# Patient Record
Sex: FEMALE | Race: BLACK OR AFRICAN AMERICAN | NOT HISPANIC OR LATINO | Employment: OTHER | URBAN - METROPOLITAN AREA
[De-identification: names, ages, dates, MRNs, and addresses within clinical notes are randomized per-mention and may not be internally consistent; named-entity substitution may affect disease eponyms.]

---

## 2018-09-04 ENCOUNTER — HOSPITAL ENCOUNTER (INPATIENT)
Facility: HOSPITAL | Age: 53
LOS: 2 days | Discharge: HOME | DRG: 246 | End: 2018-09-06
Attending: EMERGENCY MEDICINE | Admitting: EMERGENCY MEDICINE
Payer: COMMERCIAL

## 2018-09-04 DIAGNOSIS — N18.6 ESRD ON DIALYSIS: ICD-10-CM

## 2018-09-04 DIAGNOSIS — Z99.2 ESRD ON DIALYSIS: ICD-10-CM

## 2018-09-04 DIAGNOSIS — E87.70 HYPERVOLEMIA, UNSPECIFIED HYPERVOLEMIA TYPE: ICD-10-CM

## 2018-09-04 DIAGNOSIS — N18.30 CHRONIC KIDNEY DISEASE, STAGE III (MODERATE): ICD-10-CM

## 2018-09-04 DIAGNOSIS — R06.02 SOB (SHORTNESS OF BREATH): ICD-10-CM

## 2018-09-04 DIAGNOSIS — J44.9 CHRONIC OBSTRUCTIVE PULMONARY DISEASE, UNSPECIFIED COPD TYPE: ICD-10-CM

## 2018-09-04 DIAGNOSIS — I21.4 NSTEMI (NON-ST ELEVATED MYOCARDIAL INFARCTION): ICD-10-CM

## 2018-09-04 DIAGNOSIS — I21.4 NON-STEMI (NON-ST ELEVATED MYOCARDIAL INFARCTION): Primary | ICD-10-CM

## 2018-09-04 DIAGNOSIS — R06.02 SHORTNESS OF BREATH: ICD-10-CM

## 2018-09-04 PROBLEM — E78.5 HYPERLIPIDEMIA LDL GOAL <70: Status: ACTIVE | Noted: 2018-09-04

## 2018-09-04 LAB
ALBUMIN SERPL BCP-MCNC: 3.7 G/DL
ALP SERPL-CCNC: 105 U/L
ALT SERPL W/O P-5'-P-CCNC: 13 U/L
ANION GAP SERPL CALC-SCNC: 15 MMOL/L
AST SERPL-CCNC: 16 U/L
BASOPHILS # BLD AUTO: 0.03 K/UL
BASOPHILS NFR BLD: 0.4 %
BILIRUB SERPL-MCNC: 0.3 MG/DL
BNP SERPL-MCNC: 3028 PG/ML
BUN SERPL-MCNC: 67 MG/DL
CALCIUM SERPL-MCNC: 9.4 MG/DL
CHLORIDE SERPL-SCNC: 101 MMOL/L
CO2 SERPL-SCNC: 24 MMOL/L
CREAT SERPL-MCNC: 9.8 MG/DL
DIFFERENTIAL METHOD: ABNORMAL
EOSINOPHIL # BLD AUTO: 0.2 K/UL
EOSINOPHIL NFR BLD: 2.1 %
ERYTHROCYTE [DISTWIDTH] IN BLOOD BY AUTOMATED COUNT: 13.6 %
EST. GFR  (AFRICAN AMERICAN): 5 ML/MIN/1.73 M^2
EST. GFR  (NON AFRICAN AMERICAN): 4 ML/MIN/1.73 M^2
GLUCOSE SERPL-MCNC: 144 MG/DL
HCT VFR BLD AUTO: 35.2 %
HGB BLD-MCNC: 11 G/DL
INR PPP: 1.1
LYMPHOCYTES # BLD AUTO: 3 K/UL
LYMPHOCYTES NFR BLD: 37.4 %
MCH RBC QN AUTO: 33 PG
MCHC RBC AUTO-ENTMCNC: 31.3 G/DL
MCV RBC AUTO: 106 FL
MONOCYTES # BLD AUTO: 0.6 K/UL
MONOCYTES NFR BLD: 7.5 %
NEUTROPHILS # BLD AUTO: 4.3 K/UL
NEUTROPHILS NFR BLD: 52.5 %
PHOSPHATE SERPL-MCNC: 4.9 MG/DL
PLATELET # BLD AUTO: 142 K/UL
PMV BLD AUTO: 9.9 FL
POCT GLUCOSE: 217 MG/DL (ref 70–110)
POCT GLUCOSE: 219 MG/DL (ref 70–110)
POCT GLUCOSE: 244 MG/DL (ref 70–110)
POCT GLUCOSE: 361 MG/DL (ref 70–110)
POTASSIUM SERPL-SCNC: 4.3 MMOL/L
PROT SERPL-MCNC: 7.5 G/DL
PROTHROMBIN TIME: 11.1 SEC
RBC # BLD AUTO: 3.33 M/UL
SODIUM SERPL-SCNC: 140 MMOL/L
TROPONIN I SERPL DL<=0.01 NG/ML-MCNC: 2.42 NG/ML
TROPONIN I SERPL DL<=0.01 NG/ML-MCNC: 3.44 NG/ML
TROPONIN I SERPL DL<=0.01 NG/ML-MCNC: 3.56 NG/ML
WBC # BLD AUTO: 8.12 K/UL

## 2018-09-04 PROCEDURE — 85025 COMPLETE CBC W/AUTO DIFF WBC: CPT

## 2018-09-04 PROCEDURE — 93010 ELECTROCARDIOGRAM REPORT: CPT | Mod: 76,NTX,, | Performed by: INTERNAL MEDICINE

## 2018-09-04 PROCEDURE — 99285 EMERGENCY DEPT VISIT HI MDM: CPT | Mod: 25

## 2018-09-04 PROCEDURE — 25000003 PHARM REV CODE 250: Performed by: HOSPITALIST

## 2018-09-04 PROCEDURE — 93005 ELECTROCARDIOGRAM TRACING: CPT

## 2018-09-04 PROCEDURE — 80053 COMPREHEN METABOLIC PANEL: CPT

## 2018-09-04 PROCEDURE — 85610 PROTHROMBIN TIME: CPT

## 2018-09-04 PROCEDURE — 84484 ASSAY OF TROPONIN QUANT: CPT

## 2018-09-04 PROCEDURE — 83970 ASSAY OF PARATHORMONE: CPT

## 2018-09-04 PROCEDURE — 63600175 PHARM REV CODE 636 W HCPCS: Performed by: EMERGENCY MEDICINE

## 2018-09-04 PROCEDURE — 36415 COLL VENOUS BLD VENIPUNCTURE: CPT

## 2018-09-04 PROCEDURE — 84100 ASSAY OF PHOSPHORUS: CPT

## 2018-09-04 PROCEDURE — 63600175 PHARM REV CODE 636 W HCPCS: Performed by: HOSPITALIST

## 2018-09-04 PROCEDURE — 99223 1ST HOSP IP/OBS HIGH 75: CPT | Mod: ,,, | Performed by: INTERNAL MEDICINE

## 2018-09-04 PROCEDURE — 83880 ASSAY OF NATRIURETIC PEPTIDE: CPT

## 2018-09-04 PROCEDURE — 25000003 PHARM REV CODE 250: Performed by: EMERGENCY MEDICINE

## 2018-09-04 PROCEDURE — 21400001 HC TELEMETRY ROOM

## 2018-09-04 PROCEDURE — 83036 HEMOGLOBIN GLYCOSYLATED A1C: CPT

## 2018-09-04 PROCEDURE — 84484 ASSAY OF TROPONIN QUANT: CPT | Mod: 91

## 2018-09-04 PROCEDURE — 93010 ELECTROCARDIOGRAM REPORT: CPT | Mod: ,,, | Performed by: INTERNAL MEDICINE

## 2018-09-04 PROCEDURE — 80100016 HC MAINTENANCE HEMODIALYSIS

## 2018-09-04 PROCEDURE — 25000003 PHARM REV CODE 250: Performed by: INTERNAL MEDICINE

## 2018-09-04 RX ORDER — GLUCAGON 1 MG
1 KIT INJECTION
Status: DISCONTINUED | OUTPATIENT
Start: 2018-09-04 | End: 2018-09-06 | Stop reason: HOSPADM

## 2018-09-04 RX ORDER — ASPIRIN 81 MG/1
81 TABLET ORAL DAILY
Status: DISCONTINUED | OUTPATIENT
Start: 2018-09-04 | End: 2018-09-06 | Stop reason: HOSPADM

## 2018-09-04 RX ORDER — CLOPIDOGREL 300 MG/1
300 TABLET, FILM COATED ORAL ONCE
Status: COMPLETED | OUTPATIENT
Start: 2018-09-04 | End: 2018-09-04

## 2018-09-04 RX ORDER — ACETAMINOPHEN 325 MG/1
650 TABLET ORAL EVERY 6 HOURS PRN
Status: DISCONTINUED | OUTPATIENT
Start: 2018-09-05 | End: 2018-09-06 | Stop reason: HOSPADM

## 2018-09-04 RX ORDER — SODIUM CHLORIDE 9 MG/ML
INJECTION, SOLUTION INTRAVENOUS
Status: DISCONTINUED | OUTPATIENT
Start: 2018-09-04 | End: 2018-09-06 | Stop reason: HOSPADM

## 2018-09-04 RX ORDER — ATORVASTATIN CALCIUM 40 MG/1
80 TABLET, FILM COATED ORAL DAILY
Status: DISCONTINUED | OUTPATIENT
Start: 2018-09-04 | End: 2018-09-06 | Stop reason: HOSPADM

## 2018-09-04 RX ORDER — INSULIN ASPART 100 [IU]/ML
0-5 INJECTION, SOLUTION INTRAVENOUS; SUBCUTANEOUS EVERY 6 HOURS PRN
Status: DISCONTINUED | OUTPATIENT
Start: 2018-09-04 | End: 2018-09-06 | Stop reason: HOSPADM

## 2018-09-04 RX ORDER — TRAMADOL HYDROCHLORIDE 50 MG/1
50 TABLET ORAL EVERY 6 HOURS PRN
Status: COMPLETED | OUTPATIENT
Start: 2018-09-04 | End: 2018-09-05

## 2018-09-04 RX ORDER — GABAPENTIN 300 MG/1
300 CAPSULE ORAL DAILY
Status: DISCONTINUED | OUTPATIENT
Start: 2018-09-04 | End: 2018-09-06 | Stop reason: HOSPADM

## 2018-09-04 RX ORDER — HEPARIN SODIUM 5000 [USP'U]/ML
5000 INJECTION, SOLUTION INTRAVENOUS; SUBCUTANEOUS EVERY 12 HOURS
Status: DISCONTINUED | OUTPATIENT
Start: 2018-09-04 | End: 2018-09-06 | Stop reason: HOSPADM

## 2018-09-04 RX ORDER — NITROGLYCERIN 0.4 MG/1
0.4 TABLET SUBLINGUAL
Status: COMPLETED | OUTPATIENT
Start: 2018-09-04 | End: 2018-09-04

## 2018-09-04 RX ORDER — ACETAMINOPHEN 500 MG
1000 TABLET ORAL
Status: COMPLETED | OUTPATIENT
Start: 2018-09-04 | End: 2018-09-04

## 2018-09-04 RX ORDER — ISOSORBIDE DINITRATE 10 MG/1
10 TABLET ORAL 4 TIMES DAILY
COMMUNITY

## 2018-09-04 RX ORDER — CARVEDILOL 6.25 MG/1
25 TABLET ORAL 2 TIMES DAILY
Status: DISCONTINUED | OUTPATIENT
Start: 2018-09-04 | End: 2018-09-06 | Stop reason: HOSPADM

## 2018-09-04 RX ORDER — ISOSORBIDE DINITRATE 10 MG/1
10 TABLET ORAL 4 TIMES DAILY
Status: DISCONTINUED | OUTPATIENT
Start: 2018-09-04 | End: 2018-09-06 | Stop reason: HOSPADM

## 2018-09-04 RX ORDER — ALBUTEROL SULFATE 2.5 MG/.5ML
0.63 SOLUTION RESPIRATORY (INHALATION) EVERY 6 HOURS PRN
Status: DISCONTINUED | OUTPATIENT
Start: 2018-09-04 | End: 2018-09-06 | Stop reason: HOSPADM

## 2018-09-04 RX ORDER — ALBUTEROL SULFATE 0.63 MG/3ML
0.63 SOLUTION RESPIRATORY (INHALATION) EVERY 6 HOURS PRN
COMMUNITY

## 2018-09-04 RX ADMIN — ISOSORBIDE DINITRATE 10 MG: 10 TABLET ORAL at 08:09

## 2018-09-04 RX ADMIN — INSULIN ASPART 3 UNITS: 100 INJECTION, SOLUTION INTRAVENOUS; SUBCUTANEOUS at 09:09

## 2018-09-04 RX ADMIN — ATORVASTATIN CALCIUM 80 MG: 40 TABLET, FILM COATED ORAL at 05:09

## 2018-09-04 RX ADMIN — CARVEDILOL 25 MG: 6.25 TABLET, FILM COATED ORAL at 08:09

## 2018-09-04 RX ADMIN — ISOSORBIDE DINITRATE 10 MG: 10 TABLET ORAL at 05:09

## 2018-09-04 RX ADMIN — CLOPIDOGREL BISULFATE 300 MG: 300 TABLET, FILM COATED ORAL at 05:09

## 2018-09-04 RX ADMIN — NITROGLYCERIN 0.4 MG: 0.4 TABLET SUBLINGUAL at 07:09

## 2018-09-04 RX ADMIN — ACETAMINOPHEN 1000 MG: 500 TABLET, FILM COATED ORAL at 08:09

## 2018-09-04 RX ADMIN — TRAMADOL HYDROCHLORIDE 50 MG: 50 TABLET, FILM COATED ORAL at 11:09

## 2018-09-04 RX ADMIN — INSULIN ASPART 2 UNITS: 100 INJECTION, SOLUTION INTRAVENOUS; SUBCUTANEOUS at 06:09

## 2018-09-04 RX ADMIN — HEPARIN SODIUM 5000 UNITS: 5000 INJECTION, SOLUTION INTRAVENOUS; SUBCUTANEOUS at 08:09

## 2018-09-04 RX ADMIN — INSULIN ASPART 2 UNITS: 100 INJECTION, SOLUTION INTRAVENOUS; SUBCUTANEOUS at 12:09

## 2018-09-04 NOTE — NURSING
Patient returned from dialysis area . Vitals taken blood sugar taken and covered with low correction dose insulin as ordered. Patient denies pain. Placed on oxygen at 2 liters nasal canula. Head of bed up call light in reach side rail up x 2 .

## 2018-09-04 NOTE — CONSULTS
Renal Consult    Date of Admission:  9/4/2018  5:09 AM    HPI: 52 y.o. AA Female with Hx. ESRD on HD, CAD with PCI at [facility], HTN, COPD, CHF who pesented  to [facility] ED earlier today c/o acute onset SOB with wheezing x4 hours. Pt reports her SOB began yesterday but has gradually worsened. Pt reports she was scheduled to receive dialysis at 8:30 a.m. Today but states she couldn't make it, she  received her last full dialysis Saturday.   Pte. denied chest pain, diaphoresis, abdominal pain and fever.   And reports gaining to much fluid weight this weekend due to lack of restraint w/ her dietary limitations.  Troponin was significantly elevated consistent with non ST elevation MI.  Consulted for ESRD management.    Past Medical History:   Diagnosis Date    Anemia     Anticoagulant long-term use     CAD (coronary artery disease)     Cancer     CHF (congestive heart failure)     Chronic kidney disease     Chronic kidney disease (CKD), stage III (moderate)     COPD (chronic obstructive pulmonary disease)     Depression     Diabetes mellitus     Dyslipidemia     Hyperlipidemia     Hypertension     Secondary hyperparathyroidism      Past Surgical History:   Procedure Laterality Date    AV FISTULA PLACEMENT Right     CHOLECYSTECTOMY      CORONARY ARTERY BYPASS GRAFT  x3    4/7/2012    HYSTERECTOMY      left mastectomy      MASTECTOMY       Review of patient's allergies indicates:  No Known Allergies    No current facility-administered medications on file prior to encounter.      Current Outpatient Medications on File Prior to Encounter   Medication Sig Dispense Refill    albuterol (ACCUNEB) 0.63 mg/3 mL Nebu Take 0.63 mg by nebulization every 6 (six) hours as needed. Rescue      aspirin (ECOTRIN) 81 MG EC tablet Take 81 mg by mouth once daily.      carvedilol (COREG) 25 MG tablet Take 1 tablet (25 mg total) by mouth 2 (two) times daily. 60 tablet  3    fluticasone 50 mcg/actuation DsDv Inhale into the lungs.      gabapentin (NEURONTIN) 300 MG capsule Take 1 capsule (300 mg total) by mouth 2 (two) times daily. 60 capsule 3    isosorbide dinitrate (ISORDIL) 10 MG tablet Take 10 mg by mouth 4 (four) times daily.      blood sugar diagnostic (FREESTYLE INSULINX TEST STRIPS) Strp 1 strip by Misc.(Non-Drug; Combo Route) route 4 (four) times daily with meals and nightly. 100 strip 3    insulin syringes, disposable, 1 mL Syrg 1 Syringe by Misc.(Non-Drug; Combo Route) route 4 (four) times daily before meals and nightly. 100 each 3    lancets (FREESTYLE LANCETS) 28 gauge Misc 1 lancet by Misc.(Non-Drug; Combo Route) route 2 hours after meals and at bedtime. 100 each 3    nitroGLYCERIN (NITROSTAT) 0.4 MG SL tablet Place 1 tablet (0.4 mg total) under the tongue every 5 (five) minutes as needed for Chest pain. 90 tablet 3    [DISCONTINUED] amlodipine (NORVASC) 10 MG tablet TAKE 1 TABLET BY MOUTH ONCE DAILY 90 tablet 0    [DISCONTINUED] furosemide (LASIX) 40 MG tablet Take 1 tablet (40 mg total) by mouth daily as needed (for more than a 3 lbs weight kris in three days). 60 tablet 3    [DISCONTINUED] guaifenesin (MUCINEX) 600 mg 12 hr tablet Take 1,200 mg by mouth daily as needed.      [DISCONTINUED] hydrALAZINE (APRESOLINE) 50 MG tablet Take 1 tablet (50 mg total) by mouth 2 (two) times daily. 60 tablet 3    [DISCONTINUED] insulin aspart (NOVOLOG) 100 unit/mL injection Inject 10 Units into the skin 3 (three) times daily before meals. 10 mL 3    [DISCONTINUED] insulin detemir (LEVEMIR) 100 unit/mL injection Inject 23 Units into the skin every evening. 10 mL 3    [DISCONTINUED] lisinopril 10 MG tablet Take 1 tablet (10 mg total) by mouth once daily. 30 tablet 6    [DISCONTINUED] rosuvastatin (CRESTOR) 10 MG tablet Take 1 tablet (10 mg total) by mouth once daily. 30 tablet 3    [DISCONTINUED] sertraline (ZOLOFT) 50 MG tablet Take 1 tablet (50 mg total) by mouth  once daily. 30 tablet 11    [DISCONTINUED] spironolactone (ALDACTONE) 50 MG tablet TAKE ONE TABLET BY MOUTH AT BEDTIME 30 tablet 3    [DISCONTINUED] tramadol (ULTRAM) 50 mg tablet Take 1 tablet (50 mg total) by mouth every 6 (six) hours as needed. 30 tablet 0       Social History     Socioeconomic History    Marital status: Single     Spouse name: Not on file    Number of children: Not on file    Years of education: Not on file    Highest education level: Not on file   Social Needs    Financial resource strain: Not on file    Food insecurity - worry: Not on file    Food insecurity - inability: Not on file    Transportation needs - medical: Not on file    Transportation needs - non-medical: Not on file   Occupational History    Occupation: Retired   Tobacco Use    Smoking status: Current Every Day Smoker     Packs/day: 0.50     Years: 40.00     Pack years: 20.00     Types: Cigarettes    Smokeless tobacco: Never Used   Substance and Sexual Activity    Alcohol use: No    Drug use: No    Sexual activity: No   Other Topics Concern    Not on file   Social History Narrative    Not on file     Family History   Problem Relation Age of Onset    Rheum arthritis Mother     Diabetes Mother     Hypertension Mother     Rheum arthritis Maternal Grandmother     Cancer Maternal Grandmother     Asthma Sister     Hypertension Sister      Review of Systems   Constitution: Negative.   HENT: Negative.    Eyes: Negative.    Cardiovascular: Positive for chest pain and dyspnea on exertion. Negative for irregular heartbeat, leg swelling, near-syncope, orthopnea, palpitations, paroxysmal nocturnal dyspnea and syncope.   Respiratory: Positive for shortness of breath.    Skin: Negative.    Musculoskeletal: Negative.    Gastrointestinal: Negative for abdominal pain, constipation and diarrhea.   Genitourinary: Negative for dysuria.   Neurological: Negative.    Psychiatric/Behavioral: Negative.      Physical  Exam:    Vitals:    09/04/18 1430 09/04/18 1500 09/04/18 1530 09/04/18 1600   BP: 123/86 124/60 (!) 95/50 93/77   BP Location:       Patient Position:       Pulse: 87 102 95 82   Resp:       Temp:       TempSrc:       SpO2:       Weight:       Height:           No intake/output data recorded.  I/O this shift:  In: 3 [I.V.:3]  Out: -     Constitutional: obese female in NAD  HENT: n/a  Neck: supple.   Cardiovascular: Normal rate and regular rhythm.   Pulmonary/Chest:  No respiratory distress.   Abdominal: obese  Musculoskeletal: She exhibits no edema.   Neurological: She is alert and oriented to person, place, and time.       Laboratories:    Recent Labs   Lab  09/04/18   0547   WBC  8.12   RBC  3.33*   HGB  11.0*   HCT  35.2*   PLT  142*   MCV  106*   MCH  33.0*   MCHC  31.3*       Recent Labs   Lab  09/04/18   0547   CALCIUM  9.4   PROT  7.5   NA  140   K  4.3   CO2  24   CL  101   BUN  67*   CREATININE  9.8*   ALKPHOS  105   ALT  13   AST  16   BILITOT  0.3     Phosphorus                  4.9       Troponin I                  3.555  Troponin I     BNP                  3,028  BNP     Diagnostic Tests:   X-Ray Chest AP Portable [[id number]  FINDINGS:  Monitoring leads overlie the chest.  There is a right-sided chest port in place with catheter tip projecting over the SVC.  There is postoperative change of prior median sternotomy.  There is stable enlargement of the cardiomediastinal silhouette.  Lungs are symmetrically expanded with increased interstitial and parenchymal attenuation which can be seen with pulmonary edema/CHF.  Possible small component of right pleural fluid.  No pneumothorax.  Surgical clips project over the left axilla/chest wall.  Visualized osseous structures are intact.   Impression:       Cardiomegaly and findings suggestive of pulmonary edema/CHF.      Electronically signed by: Barby Cho MD  Date: 09/04/2018  Time: 06:43       Assessment:    53 y/o AAF with Hx. ESRD on HD admitted with:    -  NSTEMI  - Fluid overload likely due to acute on  Chronic combined HF  - Hx. COPD  - HTN  - DM-2  - Anemia of CKD  - Hx. 2nd. hyperparathyroidism  - Hx. CAD    Plan:    - Dialysis in progress  - UF as tolerated  - No need for Epogen at present Hgb level  - Renal ADA diet w/ fluid restriction  - Cardiology following, possible LHC in a.m.  - Glycemic control per admitting

## 2018-09-04 NOTE — CONSULTS
[facility]  Cardiology  Consult Note    Patient Name: Josephine Aldrich  MRN: [id number]  Admission Date: 9/4/2018  Hospital Length of Stay: 0 days  Code Status: Full Code   Attending Provider: Rayne Brandon MD   Consulting Provider: Juan Solomon MD  Primary Care Physician: Bunny Pinzon Jr, MD  Principal Problem:Non-STEMI (non-ST elevated myocardial infarction)    Patient information was obtained from patient, past medical records and ER records.     Inpatient consult to Cardiology  Consult performed by: Juan Solomon MD  Consult ordered by: Rayne Brandon MD  Reason for consult:  non STEMI        Subjective:     Chief Complaint:   Chest pain     HPI:   52 y.o. Female presents to the ED c/o acute onset, SOB with wheezing x4 hours. Pt reports her SOB began yesterday but has gradually worsened. Pt reports she was scheduled to receive dialysis at 8:30 Tuesday morning but states she couldn't make it. Pt reports she received her last full dialysis Saturday. Per EMS pt received breathing treatments along with Solumedrol. Pt denies chest pain, diaphoresis, abdominal pain, and fever.     She has a prior history of CAD with PCI at [facility].  [facility]   Had similar presentation when she had her stent placed for MI.  He does not have a stent card.  She denies any other associated symptoms.  Currently she is chest pain-free.  She denies any PND, orthopnea or lower extremity edema.  She is not experiencing dizziness, presyncope or syncope.  Today is her dialysis day.   Troponin is significantly elevated consistent with non ST elevation MI.        Past Medical History:   Diagnosis Date    Anemia     Anticoagulant long-term use     CAD (coronary artery disease)     Cancer     CHF (congestive heart failure)     Chronic kidney disease     Chronic kidney disease (CKD), stage III (moderate)     COPD (chronic obstructive pulmonary disease)      Depression     Diabetes mellitus     Dyslipidemia     Hyperlipidemia     Hypertension     Secondary hyperparathyroidism        Past Surgical History:   Procedure Laterality Date    AV FISTULA PLACEMENT Right     CHOLECYSTECTOMY      CORONARY ARTERY BYPASS GRAFT  x3    4/7/2012    HYSTERECTOMY      left mastectomy      MASTECTOMY         Review of patient's allergies indicates:  No Known Allergies    No current facility-administered medications on file prior to encounter.      Current Outpatient Medications on File Prior to Encounter   Medication Sig    albuterol (ACCUNEB) 0.63 mg/3 mL Nebu Take 0.63 mg by nebulization every 6 (six) hours as needed. Rescue    aspirin (ECOTRIN) 81 MG EC tablet Take 81 mg by mouth once daily.    carvedilol (COREG) 25 MG tablet Take 1 tablet (25 mg total) by mouth 2 (two) times daily.    fluticasone 50 mcg/actuation DsDv Inhale into the lungs.    gabapentin (NEURONTIN) 300 MG capsule Take 1 capsule (300 mg total) by mouth 2 (two) times daily.    isosorbide dinitrate (ISORDIL) 10 MG tablet Take 10 mg by mouth 4 (four) times daily.    blood sugar diagnostic (FREESTYLE INSULINX TEST STRIPS) Strp 1 strip by Misc.(Non-Drug; Combo Route) route 4 (four) times daily with meals and nightly.    insulin syringes, disposable, 1 mL Syrg 1 Syringe by Misc.(Non-Drug; Combo Route) route 4 (four) times daily before meals and nightly.    lancets (FREESTYLE LANCETS) 28 gauge Misc 1 lancet by Misc.(Non-Drug; Combo Route) route 2 hours after meals and at bedtime.    nitroGLYCERIN (NITROSTAT) 0.4 MG SL tablet Place 1 tablet (0.4 mg total) under the tongue every 5 (five) minutes as needed for Chest pain.    [DISCONTINUED] amlodipine (NORVASC) 10 MG tablet TAKE 1 TABLET BY MOUTH ONCE DAILY    [DISCONTINUED] furosemide (LASIX) 40 MG tablet Take 1 tablet (40 mg total) by mouth daily as needed (for more than a 3 lbs weight kris in three days).    [DISCONTINUED] guaifenesin (MUCINEX) 600 mg  12 hr tablet Take 1,200 mg by mouth daily as needed.    [DISCONTINUED] hydrALAZINE (APRESOLINE) 50 MG tablet Take 1 tablet (50 mg total) by mouth 2 (two) times daily.    [DISCONTINUED] insulin aspart (NOVOLOG) 100 unit/mL injection Inject 10 Units into the skin 3 (three) times daily before meals.    [DISCONTINUED] insulin detemir (LEVEMIR) 100 unit/mL injection Inject 23 Units into the skin every evening.    [DISCONTINUED] lisinopril 10 MG tablet Take 1 tablet (10 mg total) by mouth once daily.    [DISCONTINUED] rosuvastatin (CRESTOR) 10 MG tablet Take 1 tablet (10 mg total) by mouth once daily.    [DISCONTINUED] sertraline (ZOLOFT) 50 MG tablet Take 1 tablet (50 mg total) by mouth once daily.    [DISCONTINUED] spironolactone (ALDACTONE) 50 MG tablet TAKE ONE TABLET BY MOUTH AT BEDTIME    [DISCONTINUED] tramadol (ULTRAM) 50 mg tablet Take 1 tablet (50 mg total) by mouth every 6 (six) hours as needed.     Family History     Problem Relation (Age of Onset)    Asthma Sister    Cancer Maternal Grandmother    Diabetes Mother    Hypertension Mother, Sister    Rheum arthritis Mother, Maternal Grandmother        Tobacco Use    Smoking status: Current Every Day Smoker     Packs/day: 0.50     Years: 40.00     Pack years: 20.00     Types: Cigarettes    Smokeless tobacco: Never Used   Substance and Sexual Activity    Alcohol use: No    Drug use: No    Sexual activity: No     Review of Systems   Constitution: Negative.   HENT: Negative.    Eyes: Negative.    Cardiovascular: Positive for chest pain and dyspnea on exertion. Negative for irregular heartbeat, leg swelling, near-syncope, orthopnea, palpitations, paroxysmal nocturnal dyspnea and syncope.   Respiratory: Positive for shortness of breath.    Skin: Negative.    Musculoskeletal: Negative.    Gastrointestinal: Negative for abdominal pain, constipation and diarrhea.   Genitourinary: Negative for dysuria.   Neurological: Negative.    Psychiatric/Behavioral:  Negative.      Objective:     Vital Signs (Most Recent):  Temp: 97.1 °F (36.2 °C) (09/04/18 1245)  Pulse: 105 (09/04/18 1330)  Resp: 18 (09/04/18 1003)  BP: 136/80 (09/04/18 1330)  SpO2: (!) 90 % (09/04/18 1003) Vital Signs (24h Range):  Temp:  [97.1 °F (36.2 °C)-98.4 °F (36.9 °C)] 97.1 °F (36.2 °C)  Pulse:  [] 105  Resp:  [16-20] 18  SpO2:  [90 %-100 %] 90 %  BP: (115-144)/(54-86) 136/80     Weight: 100.9 kg (222 lb 7.1 oz)  Body mass index is 40.69 kg/m².    SpO2: (!) 90 %  O2 Device (Oxygen Therapy): nasal cannula      Intake/Output Summary (Last 24 hours) at 9/4/2018 1522  Last data filed at 9/4/2018 1003  Gross per 24 hour   Intake 3 ml   Output --   Net 3 ml       Lines/Drains/Airways     Drain                 Hemodialysis AV Fistula   Right upper arm -- days          Peripheral Intravenous Line                 Peripheral IV - Single Lumen 09/04/18 0508 Left Wrist less than 1 day                Physical Exam   Constitutional: She is oriented to person, place, and time. She appears well-developed and well-nourished.   HENT:   Head: Normocephalic and atraumatic.   Eyes: Conjunctivae and EOM are normal. Pupils are equal, round, and reactive to light.   Neck: Normal range of motion. Neck supple. No thyromegaly present.   Cardiovascular: Normal rate and regular rhythm.   No murmur heard.  Pulmonary/Chest: Effort normal and breath sounds normal. No respiratory distress.   Abdominal: Soft. Bowel sounds are normal.   Musculoskeletal: She exhibits no edema.   Neurological: She is alert and oriented to person, place, and time.   Skin: Skin is warm and dry.   Psychiatric: She has a normal mood and affect. Her behavior is normal.       Significant Labs:   CMP   Recent Labs   Lab  09/04/18   0547   NA  140   K  4.3   CL  101   CO2  24   GLU  144*   BUN  67*   CREATININE  9.8*   CALCIUM  9.4   PROT  7.5   ALBUMIN  3.7   BILITOT  0.3   ALKPHOS  105   AST  16   ALT  13   ANIONGAP  15   ESTGFRAFRICA  5*   EGFRNONAA  4*    , CBC   Recent Labs   Lab  09/04/18   0547   WBC  8.12   HGB  11.0*   HCT  35.2*   PLT  142*   , INR   Recent Labs   Lab  09/04/18   0547   INR  1.1   , Lipid Panel No results for input(s): CHOL, HDL, LDLCALC, TRIG, CHOLHDL in the last 48 hours. and Troponin   Recent Labs   Lab  09/04/18   0547  09/04/18   1417   TROPONINI  2.424*  3.555*       Significant Imaging: EKG: Normal sinus rhythm with nonspecific ST-T changes    Assessment and Plan:     * Non-STEMI (non-ST elevated myocardial infarction)     Ruling in for ACS   Plan is for cardiac catheterization after stabilization with hemodialysis   patient is loaded on anti-platelet therapy   likely plan for cardiac catheterization in a.m. As currently stable    **Risks/benefits of the procedure were d/w the patient including bleeding, infection, death, mi, arrhythmia, kidney failure, stroke, etc.  Patient understands and consent was placed on the chart.        CAD (coronary artery disease)     history of CABG and subsequent PCI   Continue medicines for secondary prevention as tolerated        ESRD (end stage renal disease)     On HD,  Today's dialysis day and currently symptomatic   will need HD  To stabilize for respiratory prior to intervention        Hypertension             Type II or unspecified type diabetes mellitus with ophthalmic manifestations, uncontrolled(250.52)     Per IM        Hyperlipidemia LDL goal <70     On statin        Tobacco abuse      Counseled            VTE Risk Mitigation (From admission, onward)        Ordered     heparin (porcine) injection 5,000 Units  Every 12 hours      09/04/18 1239     IP VTE HIGH RISK PATIENT  Once      09/04/18 1059          Thank you for your consult. I will follow-up with patient. Please contact us if you have any additional questions.    Juan Solomon MD  Cardiology   [facility]

## 2018-09-04 NOTE — HPI
52 y.o. Female presents to the ED c/o acute onset, SOB with wheezing x4 hours. Pt reports her SOB began yesterday but has gradually worsened. Pt reports she was scheduled to receive dialysis at 8:30 Tuesday morning but states she couldn't make it. Pt reports she received her last full dialysis Saturday. Per EMS pt received breathing treatments along with Solumedrol. Pt denies chest pain, diaphoresis, abdominal pain, and fever.     She has a prior history of CAD with PCI at [facility].  [facility].   Had similar presentation when she had her stent placed for MI.  He does not have a stent card.  She denies any other associated symptoms.  Currently she is chest pain-free.  She denies any PND, orthopnea or lower extremity edema.  She is not experiencing dizziness, presyncope or syncope.  Today is her dialysis day.   Troponin is significantly elevated consistent with non ST elevation MI.

## 2018-09-04 NOTE — NURSING
Report on patients status post dialysis received from jessie sinclair rn patient tolerated very well vitals stable no fever removed 4 liters off. Awaiting patients return to floor.

## 2018-09-04 NOTE — SUBJECTIVE & OBJECTIVE
Past Medical History:   Diagnosis Date    Anemia     Anticoagulant long-term use     CAD (coronary artery disease)     Cancer     CHF (congestive heart failure)     Chronic kidney disease     Chronic kidney disease (CKD), stage III (moderate)     COPD (chronic obstructive pulmonary disease)     Depression     Diabetes mellitus     Dyslipidemia     Hyperlipidemia     Hypertension     Secondary hyperparathyroidism        Past Surgical History:   Procedure Laterality Date    AV FISTULA PLACEMENT Right     CHOLECYSTECTOMY      CORONARY ARTERY BYPASS GRAFT  x3    4/7/2012    HYSTERECTOMY      left mastectomy      MASTECTOMY         Review of patient's allergies indicates:  No Known Allergies    No current facility-administered medications on file prior to encounter.      Current Outpatient Medications on File Prior to Encounter   Medication Sig    albuterol (ACCUNEB) 0.63 mg/3 mL Nebu Take 0.63 mg by nebulization every 6 (six) hours as needed. Rescue    aspirin (ECOTRIN) 81 MG EC tablet Take 81 mg by mouth once daily.    carvedilol (COREG) 25 MG tablet Take 1 tablet (25 mg total) by mouth 2 (two) times daily.    fluticasone 50 mcg/actuation DsDv Inhale into the lungs.    gabapentin (NEURONTIN) 300 MG capsule Take 1 capsule (300 mg total) by mouth 2 (two) times daily.    isosorbide dinitrate (ISORDIL) 10 MG tablet Take 10 mg by mouth 4 (four) times daily.    blood sugar diagnostic (FREESTYLE INSULINX TEST STRIPS) Strp 1 strip by Misc.(Non-Drug; Combo Route) route 4 (four) times daily with meals and nightly.    insulin syringes, disposable, 1 mL Syrg 1 Syringe by Misc.(Non-Drug; Combo Route) route 4 (four) times daily before meals and nightly.    lancets (FREESTYLE LANCETS) 28 gauge Misc 1 lancet by Misc.(Non-Drug; Combo Route) route 2 hours after meals and at bedtime.    nitroGLYCERIN (NITROSTAT) 0.4 MG SL tablet Place 1 tablet (0.4 mg total) under the tongue every 5 (five) minutes as needed  for Chest pain.    [DISCONTINUED] amlodipine (NORVASC) 10 MG tablet TAKE 1 TABLET BY MOUTH ONCE DAILY    [DISCONTINUED] furosemide (LASIX) 40 MG tablet Take 1 tablet (40 mg total) by mouth daily as needed (for more than a 3 lbs weight kris in three days).    [DISCONTINUED] guaifenesin (MUCINEX) 600 mg 12 hr tablet Take 1,200 mg by mouth daily as needed.    [DISCONTINUED] hydrALAZINE (APRESOLINE) 50 MG tablet Take 1 tablet (50 mg total) by mouth 2 (two) times daily.    [DISCONTINUED] insulin aspart (NOVOLOG) 100 unit/mL injection Inject 10 Units into the skin 3 (three) times daily before meals.    [DISCONTINUED] insulin detemir (LEVEMIR) 100 unit/mL injection Inject 23 Units into the skin every evening.    [DISCONTINUED] lisinopril 10 MG tablet Take 1 tablet (10 mg total) by mouth once daily.    [DISCONTINUED] rosuvastatin (CRESTOR) 10 MG tablet Take 1 tablet (10 mg total) by mouth once daily.    [DISCONTINUED] sertraline (ZOLOFT) 50 MG tablet Take 1 tablet (50 mg total) by mouth once daily.    [DISCONTINUED] spironolactone (ALDACTONE) 50 MG tablet TAKE ONE TABLET BY MOUTH AT BEDTIME    [DISCONTINUED] tramadol (ULTRAM) 50 mg tablet Take 1 tablet (50 mg total) by mouth every 6 (six) hours as needed.     Family History     Problem Relation (Age of Onset)    Asthma Sister    Cancer Maternal Grandmother    Diabetes Mother    Hypertension Mother, Sister    Rheum arthritis Mother, Maternal Grandmother        Tobacco Use    Smoking status: Current Every Day Smoker     Packs/day: 0.50     Years: 40.00     Pack years: 20.00     Types: Cigarettes    Smokeless tobacco: Never Used   Substance and Sexual Activity    Alcohol use: No    Drug use: No    Sexual activity: No     Review of Systems   Constitution: Negative.   HENT: Negative.    Eyes: Negative.    Cardiovascular: Positive for chest pain and dyspnea on exertion. Negative for irregular heartbeat, leg swelling, near-syncope, orthopnea, palpitations,  paroxysmal nocturnal dyspnea and syncope.   Respiratory: Positive for shortness of breath.    Skin: Negative.    Musculoskeletal: Negative.    Gastrointestinal: Negative for abdominal pain, constipation and diarrhea.   Genitourinary: Negative for dysuria.   Neurological: Negative.    Psychiatric/Behavioral: Negative.      Objective:     Vital Signs (Most Recent):  Temp: 97.1 °F (36.2 °C) (09/04/18 1245)  Pulse: 105 (09/04/18 1330)  Resp: 18 (09/04/18 1003)  BP: 136/80 (09/04/18 1330)  SpO2: (!) 90 % (09/04/18 1003) Vital Signs (24h Range):  Temp:  [97.1 °F (36.2 °C)-98.4 °F (36.9 °C)] 97.1 °F (36.2 °C)  Pulse:  [] 105  Resp:  [16-20] 18  SpO2:  [90 %-100 %] 90 %  BP: (115-144)/(54-86) 136/80     Weight: 100.9 kg (222 lb 7.1 oz)  Body mass index is 40.69 kg/m².    SpO2: (!) 90 %  O2 Device (Oxygen Therapy): nasal cannula      Intake/Output Summary (Last 24 hours) at 9/4/2018 1522  Last data filed at 9/4/2018 1003  Gross per 24 hour   Intake 3 ml   Output --   Net 3 ml       Lines/Drains/Airways     Drain                 Hemodialysis AV Fistula   Right upper arm -- days          Peripheral Intravenous Line                 Peripheral IV - Single Lumen 09/04/18 0508 Left Wrist less than 1 day                Physical Exam   Constitutional: She is oriented to person, place, and time. She appears well-developed and well-nourished.   HENT:   Head: Normocephalic and atraumatic.   Eyes: Conjunctivae and EOM are normal. Pupils are equal, round, and reactive to light.   Neck: Normal range of motion. Neck supple. No thyromegaly present.   Cardiovascular: Normal rate and regular rhythm.   No murmur heard.  Pulmonary/Chest: Effort normal and breath sounds normal. No respiratory distress.   Abdominal: Soft. Bowel sounds are normal.   Musculoskeletal: She exhibits no edema.   Neurological: She is alert and oriented to person, place, and time.   Skin: Skin is warm and dry.   Psychiatric: She has a normal mood and affect. Her  behavior is normal.       Significant Labs:   CMP   Recent Labs   Lab  09/04/18   0547   NA  140   K  4.3   CL  101   CO2  24   GLU  144*   BUN  67*   CREATININE  9.8*   CALCIUM  9.4   PROT  7.5   ALBUMIN  3.7   BILITOT  0.3   ALKPHOS  105   AST  16   ALT  13   ANIONGAP  15   ESTGFRAFRICA  5*   EGFRNONAA  4*   , CBC   Recent Labs   Lab  09/04/18   0547   WBC  8.12   HGB  11.0*   HCT  35.2*   PLT  142*   , INR   Recent Labs   Lab  09/04/18   0547   INR  1.1   , Lipid Panel No results for input(s): CHOL, HDL, LDLCALC, TRIG, CHOLHDL in the last 48 hours. and Troponin   Recent Labs   Lab  09/04/18   0547  09/04/18   1417   TROPONINI  2.424*  3.555*       Significant Imaging: EKG: Normal sinus rhythm with nonspecific ST-T changes

## 2018-09-04 NOTE — ED PROVIDER NOTES
Encounter Date: 9/4/2018    SCRIBE #1 NOTE: I, Chapito Terry II, am scribing for, and in the presence of,  Markie Patel MD. I have scribed the following portions of the note - Other sections scribed: HPI, ROS, PE.       History     Chief Complaint   Patient presents with    Shortness of Breath     hx of CHF, COPD x 30 hrs, at 90% on RA, given 2 treatments and solumedrol with EMS      CC: SOB     HPI: This 52 y.o. Female presents to the ED c/o acute onset, SOB with wheezing x4 hours. Pt reports her SOB began yesterday but has gradually worsened. Pt reports she was scheduled to receive dialysis at 8:30 Tuesday morning but states she couldn't make it. Pt reports she received her last full dialysis Saturday. Per EMS pt received breathing treatments along with Solumedrol. Pt denies chest pain, diaphoresis, abdominal pain, and fever.       PMHx: stage III CKD, HTN, anemia, secondary hyperparathyroidism, CAD, CHF, dyslipidemia, NIDDM, depression, COPD, cancer,       The history is provided by the patient. No  was used.     Review of patient's allergies indicates:  No Known Allergies  Past Medical History:   Diagnosis Date    Anemia     Anticoagulant long-term use     CAD (coronary artery disease)     Cancer     CHF (congestive heart failure)     Chronic kidney disease     Chronic kidney disease (CKD), stage III (moderate)     COPD (chronic obstructive pulmonary disease)     Depression     Diabetes mellitus     Dyslipidemia     Hyperlipidemia     Hypertension     Secondary hyperparathyroidism      Past Surgical History:   Procedure Laterality Date    AV FISTULA PLACEMENT Right     CHOLECYSTECTOMY      CORONARY ARTERY BYPASS GRAFT  x3    4/7/2012    HYSTERECTOMY      left mastectomy      MASTECTOMY       Family History   Problem Relation Age of Onset    Rheum arthritis Mother     Diabetes Mother     Hypertension Mother     Rheum arthritis Maternal Grandmother     Cancer  Maternal Grandmother     Asthma Sister     Hypertension Sister      Social History     Tobacco Use    Smoking status: Current Every Day Smoker     Packs/day: 0.50     Years: 40.00     Pack years: 20.00     Types: Cigarettes    Smokeless tobacco: Never Used   Substance Use Topics    Alcohol use: No    Drug use: No     Review of Systems   Constitutional: Negative for chills and fever.   HENT: Negative for congestion, ear pain, rhinorrhea and sore throat.    Eyes: Negative for pain and visual disturbance.   Respiratory: Positive for shortness of breath and wheezing. Negative for cough.    Cardiovascular: Negative for chest pain.   Gastrointestinal: Negative for abdominal pain, diarrhea, nausea and vomiting.   Genitourinary: Negative for dysuria.   Musculoskeletal: Negative for back pain and neck pain.   Skin: Negative for rash.   Neurological: Negative for headaches.       Physical Exam     Initial Vitals [09/04/18 0507]   BP Pulse Resp Temp SpO2   133/78 90 16 98.4 °F (36.9 °C) 100 %      MAP       --         Physical Exam    Nursing note and vitals reviewed.  Constitutional: She appears well-developed and well-nourished. She is cooperative.  Non-toxic appearance. No distress.   HENT:   Head: Normocephalic and atraumatic.   Mouth/Throat: Oropharynx is clear and moist.   Eyes: Conjunctivae and EOM are normal. Pupils are equal, round, and reactive to light.   Neck: Normal range of motion and full passive range of motion without pain. Neck supple. No thyromegaly present. No JVD present.   Cardiovascular: Normal rate, regular rhythm, normal heart sounds and normal pulses.   Pulmonary/Chest: Effort normal. No tachypnea. No respiratory distress.   Faint crackles bilaterally    Left mastectomy       Abdominal: Soft. Normal appearance and bowel sounds are normal. She exhibits no distension and no mass. There is no tenderness.   Musculoskeletal: Normal range of motion.   Trace edema bilatrally   Neurological: She is  alert and oriented to person, place, and time. She has normal strength. No cranial nerve deficit or sensory deficit.   Skin: Skin is warm, dry and intact. No rash noted.   Dialysis graft to right upper arm   Psychiatric: She has a normal mood and affect. Her speech is normal and behavior is normal. Judgment and thought content normal.         ED Course   Procedures  Labs Reviewed   CBC W/ AUTO DIFFERENTIAL - Abnormal; Notable for the following components:       Result Value    RBC 3.33 (*)     Hemoglobin 11.0 (*)     Hematocrit 35.2 (*)      (*)     MCH 33.0 (*)     MCHC 31.3 (*)     Platelets 142 (*)     All other components within normal limits   COMPREHENSIVE METABOLIC PANEL - Abnormal; Notable for the following components:    Glucose 144 (*)     BUN, Bld 67 (*)     Creatinine 9.8 (*)     eGFR if  5 (*)     eGFR if non  4 (*)     All other components within normal limits   TROPONIN I - Abnormal; Notable for the following components:    Troponin I 2.424 (*)     All other components within normal limits   B-TYPE NATRIURETIC PEPTIDE - Abnormal; Notable for the following components:    BNP 3,028 (*)     All other components within normal limits   PHOSPHORUS - Abnormal; Notable for the following components:    Phosphorus 4.9 (*)     All other components within normal limits   PROTIME-INR     EKG Readings: (Independently Interpreted)   Initial Reading: No STEMI. Previous EKG: Compared with most recent EKG Previous EKG Date: 12/26/13. Rhythm: Normal Sinus Rhythm. Heart Rate: 95. Conduction: RBBB. ST Segments: Non-Specific ST Segment Depression. Axis: Left Axis Deviation. Other Findings: Prolonged QT Interval.   No change from previous.        Imaging Results          X-Ray Chest AP Portable (Final result)  Result time 09/04/18 06:43:12    Final result by Barby Cho MD (09/04/18 06:43:12)                 Impression:      Cardiomegaly and findings suggestive of pulmonary  edema/CHF.      Electronically signed by: Barby Cho MD  Date:    09/04/2018  Time:    06:43             Narrative:    EXAMINATION:  XR CHEST AP PORTABLE    CLINICAL HISTORY:  CHF;    TECHNIQUE:  Single frontal view of the chest was performed.    COMPARISON:  Chest radiograph 12/26/2013.    FINDINGS:  Monitoring leads overlie the chest.  There is a right-sided chest port in place with catheter tip projecting over the SVC.  There is postoperative change of prior median sternotomy.  There is stable enlargement of the cardiomediastinal silhouette.  Lungs are symmetrically expanded with increased interstitial and parenchymal attenuation which can be seen with pulmonary edema/CHF.  Possible small component of right pleural fluid.  No pneumothorax.  Surgical clips project over the left axilla/chest wall.  Visualized osseous structures are intact.                                 Medical Decision Making:   Differential Diagnosis:   Differential diagnosis includes fluid overload and COPD.  ED Management:  0600: Patient signed out to Dr. Jimenez at shift change     Patient has no chest pain but feels SOB. Is due for dialysis today. However patient states she feels just like when she has had prior heart attacks. States last LHC was 2 years ago and a stent was placed. Troponin came back significantly elevated. Consulted Dr. Solomon with cardiology who recommends admission for nonstemi and dialysis and then LHC>        Scribe Attestation:   Scribe #1: I performed the above scribed service and the documentation accurately describes the services I performed. I attest to the accuracy of the note.    Attending Attestation:           Physician Attestation for Scribe:  Physician Attestation Statement for Scribe #1: I, Markie Patel MD, reviewed documentation, as scribed by Chapito Sewell II in my presence, and it is both accurate and complete.                    Clinical Impression:   The primary encounter diagnosis was Non-STEMI  (non-ST elevated myocardial infarction). Diagnoses of Shortness of breath, SOB (shortness of breath), Hypervolemia, unspecified hypervolemia type, ESRD on dialysis, Chronic obstructive pulmonary disease, unspecified COPD type, and NSTEMI (non-ST elevated myocardial infarction) were also pertinent to this visit.                             Corey Jimenez MD  09/05/18 1858

## 2018-09-04 NOTE — ED NOTES
Informed of plan of care, pending dialysis, reason Cardiologist came to visit her, pending transfer to floor, verbalized understanding

## 2018-09-04 NOTE — SUBJECTIVE & OBJECTIVE
Past Medical History:   Diagnosis Date    Anemia     Anticoagulant long-term use     CAD (coronary artery disease)     Cancer     CHF (congestive heart failure)     Chronic kidney disease     Chronic kidney disease (CKD), stage III (moderate)     COPD (chronic obstructive pulmonary disease)     Depression     Diabetes mellitus     Dyslipidemia     Hyperlipidemia     Hypertension     Secondary hyperparathyroidism        Past Surgical History:   Procedure Laterality Date    AV FISTULA PLACEMENT Right     CHOLECYSTECTOMY      CORONARY ARTERY BYPASS GRAFT  x3    4/7/2012    HYSTERECTOMY      left mastectomy      MASTECTOMY         Review of patient's allergies indicates:  No Known Allergies    No current facility-administered medications on file prior to encounter.      Current Outpatient Medications on File Prior to Encounter   Medication Sig    albuterol (ACCUNEB) 0.63 mg/3 mL Nebu Take 0.63 mg by nebulization every 6 (six) hours as needed. Rescue    aspirin (ECOTRIN) 81 MG EC tablet Take 81 mg by mouth once daily.    carvedilol (COREG) 25 MG tablet Take 1 tablet (25 mg total) by mouth 2 (two) times daily.    fluticasone 50 mcg/actuation DsDv Inhale into the lungs.    gabapentin (NEURONTIN) 300 MG capsule Take 1 capsule (300 mg total) by mouth 2 (two) times daily.    isosorbide dinitrate (ISORDIL) 10 MG tablet Take 10 mg by mouth 4 (four) times daily.    blood sugar diagnostic (FREESTYLE INSULINX TEST STRIPS) Strp 1 strip by Misc.(Non-Drug; Combo Route) route 4 (four) times daily with meals and nightly.    insulin syringes, disposable, 1 mL Syrg 1 Syringe by Misc.(Non-Drug; Combo Route) route 4 (four) times daily before meals and nightly.    lancets (FREESTYLE LANCETS) 28 gauge Misc 1 lancet by Misc.(Non-Drug; Combo Route) route 2 hours after meals and at bedtime.    nitroGLYCERIN (NITROSTAT) 0.4 MG SL tablet Place 1 tablet (0.4 mg total) under the tongue every 5 (five) minutes as needed  for Chest pain.    [DISCONTINUED] amlodipine (NORVASC) 10 MG tablet TAKE 1 TABLET BY MOUTH ONCE DAILY    [DISCONTINUED] furosemide (LASIX) 40 MG tablet Take 1 tablet (40 mg total) by mouth daily as needed (for more than a 3 lbs weight kris in three days).    [DISCONTINUED] guaifenesin (MUCINEX) 600 mg 12 hr tablet Take 1,200 mg by mouth daily as needed.    [DISCONTINUED] hydrALAZINE (APRESOLINE) 50 MG tablet Take 1 tablet (50 mg total) by mouth 2 (two) times daily.    [DISCONTINUED] insulin aspart (NOVOLOG) 100 unit/mL injection Inject 10 Units into the skin 3 (three) times daily before meals.    [DISCONTINUED] insulin detemir (LEVEMIR) 100 unit/mL injection Inject 23 Units into the skin every evening.    [DISCONTINUED] lisinopril 10 MG tablet Take 1 tablet (10 mg total) by mouth once daily.    [DISCONTINUED] rosuvastatin (CRESTOR) 10 MG tablet Take 1 tablet (10 mg total) by mouth once daily.    [DISCONTINUED] sertraline (ZOLOFT) 50 MG tablet Take 1 tablet (50 mg total) by mouth once daily.    [DISCONTINUED] spironolactone (ALDACTONE) 50 MG tablet TAKE ONE TABLET BY MOUTH AT BEDTIME    [DISCONTINUED] tramadol (ULTRAM) 50 mg tablet Take 1 tablet (50 mg total) by mouth every 6 (six) hours as needed.     Family History     Problem Relation (Age of Onset)    Asthma Sister    Cancer Maternal Grandmother    Diabetes Mother    Hypertension Mother, Sister    Rheum arthritis Mother, Maternal Grandmother        Tobacco Use    Smoking status: Current Every Day Smoker     Packs/day: 0.50     Years: 40.00     Pack years: 20.00     Types: Cigarettes    Smokeless tobacco: Never Used   Substance and Sexual Activity    Alcohol use: No    Drug use: No    Sexual activity: No     Review of Systems   Constitutional: Negative.    HENT: Negative.    Eyes: Negative.    Respiratory: Positive for shortness of breath.    Cardiovascular: Negative.    Gastrointestinal: Negative.    Endocrine: Negative.    Genitourinary: Positive  for difficulty urinating.   Musculoskeletal: Negative.    Neurological: Negative.    Psychiatric/Behavioral: Negative.      Objective:     Vital Signs (Most Recent):  Temp: 98.3 °F (36.8 °C) (09/04/18 1003)  Pulse: 99 (09/04/18 1003)  Resp: 18 (09/04/18 1003)  BP: (!) 115/54 (09/04/18 1003)  SpO2: (!) 90 % (09/04/18 1003) Vital Signs (24h Range):  Temp:  [97.7 °F (36.5 °C)-98.4 °F (36.9 °C)] 98.3 °F (36.8 °C)  Pulse:  [] 99  Resp:  [16-20] 18  SpO2:  [90 %-100 %] 90 %  BP: (115-144)/(54-86) 115/54     Weight: 100.9 kg (222 lb 7.1 oz)  Body mass index is 40.69 kg/m².    Physical Exam   Constitutional: She is oriented to person, place, and time. She appears well-developed and well-nourished. No distress.   HENT:   Head: Normocephalic and atraumatic.   Mouth/Throat: Oropharynx is clear and moist.   Eyes: EOM are normal. Pupils are equal, round, and reactive to light.   Neck: Normal range of motion. Neck supple.   Cardiovascular: Intact distal pulses.   Murmur heard.  Tachycardia S1 S2   Pulmonary/Chest: Effort normal and breath sounds normal. No respiratory distress.   Abdominal: Soft. Bowel sounds are normal. She exhibits no distension.   Musculoskeletal: Normal range of motion. She exhibits edema.   Neurological: She is alert and oriented to person, place, and time.   Skin: Capillary refill takes less than 2 seconds.   Psychiatric: She has a normal mood and affect. Her behavior is normal.         CRANIAL NERVES     CN III, IV, VI   Pupils are equal, round, and reactive to light.  Extraocular motions are normal.        Significant Labs:   A1C: No results for input(s): HGBA1C in the last 4320 hours.  CBC:   Recent Labs   Lab  09/04/18   0547   WBC  8.12   HGB  11.0*   HCT  35.2*   PLT  142*     CMP:   Recent Labs   Lab  09/04/18   0547   NA  140   K  4.3   CL  101   CO2  24   GLU  144*   BUN  67*   CREATININE  9.8*   CALCIUM  9.4   PROT  7.5   ALBUMIN  3.7   BILITOT  0.3   ALKPHOS  105   AST  16   ALT  13    ANIONGAP  15   EGFRNONAA  4*     Cardiac Markers:   Recent Labs   Lab  09/04/18   0547   BNP  3,028*     Coagulation:   Recent Labs   Lab  09/04/18   0547   INR  1.1     Lipid Panel: No results for input(s): CHOL, HDL, LDLCALC, TRIG, CHOLHDL in the last 48 hours.  Magnesium: No results for input(s): MG in the last 48 hours.  POCT Glucose:   Recent Labs   Lab  09/04/18   1108  09/04/18   1203   POCTGLUCOSE  217*  219*     Troponin:   Recent Labs   Lab  09/04/18   0547   TROPONINI  2.424*     Urine Studies: No results for input(s): COLORU, APPEARANCEUA, PHUR, SPECGRAV, PROTEINUA, GLUCUA, KETONESU, BILIRUBINUA, OCCULTUA, NITRITE, UROBILINOGEN, LEUKOCYTESUR, RBCUA, WBCUA, BACTERIA, SQUAMEPITHEL, HYALINECASTS in the last 48 hours.    Invalid input(s): MARTA    Significant Imaging: I have reviewed all pertinent imaging results/findings within the past 24 hours.

## 2018-09-04 NOTE — ASSESSMENT & PLAN NOTE
Troponin trend  Aspirin, statin, bb  Long acting nitrate   Echo pending  HD today, defer to cardiology for LHC

## 2018-09-04 NOTE — ASSESSMENT & PLAN NOTE
Ruling in for ACS   Plan is for cardiac catheterization after stabilization with hemodialysis   patient is loaded on anti-platelet therapy   likely plan for cardiac catheterization in a.m. As currently stable    **Risks/benefits of the procedure were d/w the patient including bleeding, infection, death, mi, arrhythmia, kidney failure, stroke, etc.  Patient understands and consent was placed on the chart.

## 2018-09-04 NOTE — ASSESSMENT & PLAN NOTE
On HD,  Today's dialysis day and currently symptomatic   will need HD  To stabilize for respiratory prior to intervention

## 2018-09-04 NOTE — PLAN OF CARE
Problem: Hemodialysis (Adult)  Goal: Signs and Symptoms of Listed Potential Problems Will be Absent, Minimized or Managed (Hemodialysis)  Signs and symptoms of listed potential problems will be absent, minimized or managed by discharge/transition of care (reference Hemodialysis (Adult) [facility]).   09/04/18 1713   Hemodialysis   Problems Assessed (Hemodialysis) all   Problems Present (Hemodialysis) electrolyte imbalance;fluid imbalance   Hemodialysis for 4 hours with 4 liters fluid removal tolerated well.

## 2018-09-04 NOTE — H&P
[facility] Medicine  History & Physical    Patient Name: Josephine Aldrich  MRN: [id number]  Admission Date: 9/4/2018  Attending Physician: Rayne Brandon MD   Primary Care Provider: Bunny Pinzon Jr, MD         Patient information was obtained from patient and ER records.     Subjective:     Principal Problem:Non-STEMI (non-ST elevated myocardial infarction)    Chief Complaint:   Chief Complaint   Patient presents with    Shortness of Breath     hx of CHF, COPD x 30 hrs, at 90% on RA, given 2 treatments and solumedrol with EMS         HPI: 51 yo female with CAD/CABG, COPD, combined CHF, DM2, HLP and ESRD on HD (TTS) presented from home with SOB. She was scheduled to get HD this morning but felt bad due to respiratory distress. She was given solumedrol and nebs by EMS and felt better on arrival. She denies angina but continues to feel SOB. Currently on 2 liters NC and no respiratory distress. On admit, trop > 2 and BNP >3000. CXR c/w CHF. Cardiology consulted in ED and per ED, will plan for LHC after HD. Nephrology consulted.     Of note, patient gets care at [place] (PCP and cardiologist). Pt asked to go to [place] but EMS reported there were no beds available there. LAst ECHO here 2013 - EF 45% with DD.     Past Medical History:   Diagnosis Date    Anemia     Anticoagulant long-term use     CAD (coronary artery disease)     Cancer     CHF (congestive heart failure)     Chronic kidney disease     Chronic kidney disease (CKD), stage III (moderate)     COPD (chronic obstructive pulmonary disease)     Depression     Diabetes mellitus     Dyslipidemia     Hyperlipidemia     Hypertension     Secondary hyperparathyroidism        Past Surgical History:   Procedure Laterality Date    AV FISTULA PLACEMENT Right     CHOLECYSTECTOMY      CORONARY ARTERY BYPASS GRAFT  x3    4/7/2012    HYSTERECTOMY      left mastectomy      MASTECTOMY         Review of patient's  allergies indicates:  No Known Allergies    No current facility-administered medications on file prior to encounter.      Current Outpatient Medications on File Prior to Encounter   Medication Sig    albuterol (ACCUNEB) 0.63 mg/3 mL Nebu Take 0.63 mg by nebulization every 6 (six) hours as needed. Rescue    aspirin (ECOTRIN) 81 MG EC tablet Take 81 mg by mouth once daily.    carvedilol (COREG) 25 MG tablet Take 1 tablet (25 mg total) by mouth 2 (two) times daily.    fluticasone 50 mcg/actuation DsDv Inhale into the lungs.    gabapentin (NEURONTIN) 300 MG capsule Take 1 capsule (300 mg total) by mouth 2 (two) times daily.    isosorbide dinitrate (ISORDIL) 10 MG tablet Take 10 mg by mouth 4 (four) times daily.    blood sugar diagnostic (FREESTYLE INSULINX TEST STRIPS) Strp 1 strip by Misc.(Non-Drug; Combo Route) route 4 (four) times daily with meals and nightly.    insulin syringes, disposable, 1 mL Syrg 1 Syringe by Misc.(Non-Drug; Combo Route) route 4 (four) times daily before meals and nightly.    lancets (FREESTYLE LANCETS) 28 gauge Misc 1 lancet by Misc.(Non-Drug; Combo Route) route 2 hours after meals and at bedtime.    nitroGLYCERIN (NITROSTAT) 0.4 MG SL tablet Place 1 tablet (0.4 mg total) under the tongue every 5 (five) minutes as needed for Chest pain.    [DISCONTINUED] amlodipine (NORVASC) 10 MG tablet TAKE 1 TABLET BY MOUTH ONCE DAILY    [DISCONTINUED] furosemide (LASIX) 40 MG tablet Take 1 tablet (40 mg total) by mouth daily as needed (for more than a 3 lbs weight kris in three days).    [DISCONTINUED] guaifenesin (MUCINEX) 600 mg 12 hr tablet Take 1,200 mg by mouth daily as needed.    [DISCONTINUED] hydrALAZINE (APRESOLINE) 50 MG tablet Take 1 tablet (50 mg total) by mouth 2 (two) times daily.    [DISCONTINUED] insulin aspart (NOVOLOG) 100 unit/mL injection Inject 10 Units into the skin 3 (three) times daily before meals.    [DISCONTINUED] insulin detemir (LEVEMIR) 100 unit/mL injection  Inject 23 Units into the skin every evening.    [DISCONTINUED] lisinopril 10 MG tablet Take 1 tablet (10 mg total) by mouth once daily.    [DISCONTINUED] rosuvastatin (CRESTOR) 10 MG tablet Take 1 tablet (10 mg total) by mouth once daily.    [DISCONTINUED] sertraline (ZOLOFT) 50 MG tablet Take 1 tablet (50 mg total) by mouth once daily.    [DISCONTINUED] spironolactone (ALDACTONE) 50 MG tablet TAKE ONE TABLET BY MOUTH AT BEDTIME    [DISCONTINUED] tramadol (ULTRAM) 50 mg tablet Take 1 tablet (50 mg total) by mouth every 6 (six) hours as needed.     Family History     Problem Relation (Age of Onset)    Asthma Sister    Cancer Maternal Grandmother    Diabetes Mother    Hypertension Mother, Sister    Rheum arthritis Mother, Maternal Grandmother        Tobacco Use    Smoking status: Current Every Day Smoker     Packs/day: 0.50     Years: 40.00     Pack years: 20.00     Types: Cigarettes    Smokeless tobacco: Never Used   Substance and Sexual Activity    Alcohol use: No    Drug use: No    Sexual activity: No     Review of Systems   Constitutional: Negative.    HENT: Negative.    Eyes: Negative.    Respiratory: Positive for shortness of breath.    Cardiovascular: Negative.    Gastrointestinal: Negative.    Endocrine: Negative.    Genitourinary: Positive for difficulty urinating.   Musculoskeletal: Negative.    Neurological: Negative.    Psychiatric/Behavioral: Negative.      Objective:     Vital Signs (Most Recent):  Temp: 98.3 °F (36.8 °C) (09/04/18 1003)  Pulse: 99 (09/04/18 1003)  Resp: 18 (09/04/18 1003)  BP: (!) 115/54 (09/04/18 1003)  SpO2: (!) 90 % (09/04/18 1003) Vital Signs (24h Range):  Temp:  [97.7 °F (36.5 °C)-98.4 °F (36.9 °C)] 98.3 °F (36.8 °C)  Pulse:  [] 99  Resp:  [16-20] 18  SpO2:  [90 %-100 %] 90 %  BP: (115-144)/(54-86) 115/54     Weight: 100.9 kg (222 lb 7.1 oz)  Body mass index is 40.69 kg/m².    Physical Exam   Constitutional: She is oriented to person, place, and time. She appears  well-developed and well-nourished. No distress.   HENT:   Head: Normocephalic and atraumatic.   Mouth/Throat: Oropharynx is clear and moist.   Eyes: EOM are normal. Pupils are equal, round, and reactive to light.   Neck: Normal range of motion. Neck supple.   Cardiovascular: Intact distal pulses.   Murmur heard.  Tachycardia S1 S2   Pulmonary/Chest: Effort normal and breath sounds normal. No respiratory distress.   Abdominal: Soft. Bowel sounds are normal. She exhibits no distension.   Musculoskeletal: Normal range of motion. She exhibits edema.   Neurological: She is alert and oriented to person, place, and time.   Skin: Capillary refill takes less than 2 seconds.   Psychiatric: She has a normal mood and affect. Her behavior is normal.         CRANIAL NERVES     CN III, IV, VI   Pupils are equal, round, and reactive to light.  Extraocular motions are normal.        Significant Labs:   A1C: No results for input(s): HGBA1C in the last 4320 hours.  CBC:   Recent Labs   Lab  09/04/18   0547   WBC  8.12   HGB  11.0*   HCT  35.2*   PLT  142*     CMP:   Recent Labs   Lab  09/04/18   0547   NA  140   K  4.3   CL  101   CO2  24   GLU  144*   BUN  67*   CREATININE  9.8*   CALCIUM  9.4   PROT  7.5   ALBUMIN  3.7   BILITOT  0.3   ALKPHOS  105   AST  16   ALT  13   ANIONGAP  15   EGFRNONAA  4*     Cardiac Markers:   Recent Labs   Lab  09/04/18   0547   BNP  3,028*     Coagulation:   Recent Labs   Lab  09/04/18   0547   INR  1.1     Lipid Panel: No results for input(s): CHOL, HDL, LDLCALC, TRIG, CHOLHDL in the last 48 hours.  Magnesium: No results for input(s): MG in the last 48 hours.  POCT Glucose:   Recent Labs   Lab  09/04/18   1108  09/04/18   1203   POCTGLUCOSE  217*  219*     Troponin:   Recent Labs   Lab  09/04/18   0547   TROPONINI  2.424*     Urine Studies: No results for input(s): COLORU, APPEARANCEUA, PHUR, SPECGRAV, PROTEINUA, GLUCUA, KETONESU, BILIRUBINUA, OCCULTUA, NITRITE, UROBILINOGEN, LEUKOCYTESUR, RBCUA,  WBCUA, BACTERIA, SQUAMEPITHEL, HYALINECASTS in the last 48 hours.    Invalid input(s): MARTA    Significant Imaging: I have reviewed all pertinent imaging results/findings within the past 24 hours.    Assessment/Plan:     * Non-STEMI (non-ST elevated myocardial infarction)    Troponin trend  Aspirin, statin, bb  Long acting nitrate   Echo pending  HD today, defer to cardiology for [facility]            Hyperlipidemia LDL goal <70    Resume statin   Goal for diabetic <70          ESRD (end stage renal disease)    Nephrology consulted  HD on TTS          Chronic obstructive pulmonary disease    Nebs PRN  Likely needs long acting inhalers ?  No indication for steroids            Diabetes mellitus with neuropathy    AIc pending  SSI  Diabetic diet           Chronic combined systolic and diastolic heart failure    HD for volume control  Daily weights  Cardiac/renal diet  ECHO pending           CAD (coronary artery disease)    See above           Secondary hyperparathyroidism    PTH and phosp pending           Anemia      Secondary to CKD  Defer to nephrology  No evidence of bleeding           VTE Risk Mitigation (From admission, onward)        Ordered     IP VTE HIGH RISK PATIENT  Once      09/04/18 1453             Rayne Brandon MD  Department of Hospital Medicine   [facility]

## 2018-09-04 NOTE — NURSING
"Patient arrived to floor at 1000 placed on telemetry box vitals obtained assessment completed and plan of care updated on board and reviewed with patient at bedside. Call light in reach head of bed elevated side rail up x 2 . Skin intact. Placed on oxygen at 2 liters patient reports she recently fell and was put on tramadol at home patient states ," the tramadol I was taking has made me constipated I usually have a bowel movement everyday. Patient reports she does make urine in small amounts. Patient reports she started dialysis in 2015 av fistula site in right upper arm clean dry and intact no swelling no drainage to site open to air.   "

## 2018-09-04 NOTE — ED NOTES
"1st contact with pt, care assumed, sitting high mccormick in stretcher, appears to be sleeping, mouth open, awaken to verbal stimuli, A&Ox3,obese,  resp even non labored, reports reason for ER visit " shortness of breath" since yesterday noted, pt appears tired, states " I been up all night" , Hx: dialysis, due for dialysis today, pt's scheduled: tues, thurs, saturday, denies CP, rates pain 0/10, pt reports doctor has informed pending dialysis, noted on cardiac monitor, pt, pulse ox, bp on L lower led due mastectomy to L sided chest, dialysis access to R upper arm   "

## 2018-09-04 NOTE — ED NOTES
Physician at bedside. cardiologist Dr. Solomon, pt requesting ice chips, per doctor's verbal order, ok to have ice chips

## 2018-09-04 NOTE — HPI
53 yo female with CAD/CABG, COPD, combined CHF, DM2, HLP and ESRD on HD (TTS) presented from home with SOB. She was scheduled to get HD this morning but felt bad due to respiratory distress. She was given solumedrol and nebs by EMS and felt better on arrival. She denies angina but continues to feel SOB. Currently on 2 liters NC and no respiratory distress. On admit, trop > 2 and BNP >3000. CXR c/w CHF. Cardiology consulted in ED and per ED, will plan for LHC after HD. Nephrology consulted.     Of note, patient gets care at [place] (PCP and cardiologist). Pt asked to go to [place] but EMS reported there were no beds available there. LAst ECHO here 2013 - EF 45% with DD.

## 2018-09-04 NOTE — NURSING
"Paged dr yan he called back . I asked him was he planning to cath her in the morning. Dr Yan states ," I will put my orders in for her. The call ended.   "

## 2018-09-04 NOTE — ED TRIAGE NOTES
Presented to ED with c/o SOB all day. Pt received two breathing tx [id number] solumedrol by EMS. Respirations are unlabored when upright. 02 sat > 95 when placed on 2L NC. She is dialysis patient on T,Th, and Sat. Reported that her last dialysis day was Saturday 9/1 and she's scheduled for dialysis today at 8:30 am.

## 2018-09-05 LAB
AORTIC VALVE REGURGITATION: ABNORMAL
CHOLEST SERPL-MCNC: 149 MG/DL
CHOLEST/HDLC SERPL: 3.9 {RATIO}
DIASTOLIC DYSFUNCTION: YES
ESTIMATED AVG GLUCOSE: 148 MG/DL
ESTIMATED PA SYSTOLIC PRESSURE: 42.69
GLOBAL PERICARDIAL EFFUSION: ABNORMAL
HBA1C MFR BLD HPLC: 6.8 %
HDLC SERPL-MCNC: 38 MG/DL
HDLC SERPL: 25.5 %
LDLC SERPL CALC-MCNC: 82 MG/DL
MITRAL VALVE REGURGITATION: ABNORMAL
NONHDLC SERPL-MCNC: 111 MG/DL
POCT GLUCOSE: 150 MG/DL (ref 70–110)
POCT GLUCOSE: 172 MG/DL (ref 70–110)
POCT GLUCOSE: 187 MG/DL (ref 70–110)
POCT GLUCOSE: 197 MG/DL (ref 70–110)
PTH-INTACT SERPL-MCNC: 464 PG/ML
RETIRED EF AND QEF - SEE NOTES: 45 (ref 55–65)
TRICUSPID VALVE REGURGITATION: ABNORMAL
TRIGL SERPL-MCNC: 145 MG/DL
TROPONIN I SERPL DL<=0.01 NG/ML-MCNC: 4.77 NG/ML
TROPONIN I SERPL DL<=0.01 NG/ML-MCNC: 4.84 NG/ML

## 2018-09-05 PROCEDURE — 92937 PRQ TRLUML REVSC CAB GRF 1: CPT | Mod: RC,,, | Performed by: INTERNAL MEDICINE

## 2018-09-05 PROCEDURE — 94761 N-INVAS EAR/PLS OXIMETRY MLT: CPT

## 2018-09-05 PROCEDURE — 25000003 PHARM REV CODE 250: Performed by: INTERNAL MEDICINE

## 2018-09-05 PROCEDURE — 027034Z DILATION OF CORONARY ARTERY, ONE ARTERY WITH DRUG-ELUTING INTRALUMINAL DEVICE, PERCUTANEOUS APPROACH: ICD-10-PCS | Performed by: INTERNAL MEDICINE

## 2018-09-05 PROCEDURE — 25000003 PHARM REV CODE 250: Performed by: EMERGENCY MEDICINE

## 2018-09-05 PROCEDURE — 93306 TTE W/DOPPLER COMPLETE: CPT | Mod: 26,,, | Performed by: INTERNAL MEDICINE

## 2018-09-05 PROCEDURE — 25000003 PHARM REV CODE 250: Performed by: HOSPITALIST

## 2018-09-05 PROCEDURE — G0269 OCCLUSIVE DEVICE IN VEIN ART: HCPCS

## 2018-09-05 PROCEDURE — B2111ZZ FLUOROSCOPY OF MULTIPLE CORONARY ARTERIES USING LOW OSMOLAR CONTRAST: ICD-10-PCS | Performed by: INTERNAL MEDICINE

## 2018-09-05 PROCEDURE — B2131ZZ FLUOROSCOPY OF MULTIPLE CORONARY ARTERY BYPASS GRAFTS USING LOW OSMOLAR CONTRAST: ICD-10-PCS | Performed by: INTERNAL MEDICINE

## 2018-09-05 PROCEDURE — 80061 LIPID PANEL: CPT

## 2018-09-05 PROCEDURE — 99152 MOD SED SAME PHYS/QHP 5/>YRS: CPT | Mod: ,,, | Performed by: INTERNAL MEDICINE

## 2018-09-05 PROCEDURE — 84484 ASSAY OF TROPONIN QUANT: CPT | Mod: 91

## 2018-09-05 PROCEDURE — 93459 L HRT ART/GRFT ANGIO: CPT | Mod: 26,59,, | Performed by: INTERNAL MEDICINE

## 2018-09-05 PROCEDURE — 4A023N7 MEASUREMENT OF CARDIAC SAMPLING AND PRESSURE, LEFT HEART, PERCUTANEOUS APPROACH: ICD-10-PCS | Performed by: INTERNAL MEDICINE

## 2018-09-05 PROCEDURE — B3101ZZ FLUOROSCOPY OF THORACIC AORTA USING LOW OSMOLAR CONTRAST: ICD-10-PCS | Performed by: INTERNAL MEDICINE

## 2018-09-05 PROCEDURE — 36415 COLL VENOUS BLD VENIPUNCTURE: CPT

## 2018-09-05 PROCEDURE — 93306 TTE W/DOPPLER COMPLETE: CPT

## 2018-09-05 PROCEDURE — C1874 STENT, COATED/COV W/DEL SYS: HCPCS

## 2018-09-05 PROCEDURE — 27000221 HC OXYGEN, UP TO 24 HOURS

## 2018-09-05 PROCEDURE — 25000003 PHARM REV CODE 250

## 2018-09-05 PROCEDURE — 63600175 PHARM REV CODE 636 W HCPCS: Performed by: HOSPITALIST

## 2018-09-05 PROCEDURE — B41F1ZZ FLUOROSCOPY OF RIGHT LOWER EXTREMITY ARTERIES USING LOW OSMOLAR CONTRAST: ICD-10-PCS | Performed by: INTERNAL MEDICINE

## 2018-09-05 PROCEDURE — 63600175 PHARM REV CODE 636 W HCPCS

## 2018-09-05 PROCEDURE — 84484 ASSAY OF TROPONIN QUANT: CPT

## 2018-09-05 PROCEDURE — 21400001 HC TELEMETRY ROOM

## 2018-09-05 RX ORDER — CLOPIDOGREL BISULFATE 75 MG/1
75 TABLET ORAL DAILY
Status: DISCONTINUED | OUTPATIENT
Start: 2018-09-05 | End: 2018-09-06 | Stop reason: HOSPADM

## 2018-09-05 RX ORDER — OXYCODONE AND ACETAMINOPHEN 5; 325 MG/1; MG/1
1 TABLET ORAL EVERY 4 HOURS PRN
Status: DISCONTINUED | OUTPATIENT
Start: 2018-09-05 | End: 2018-09-06 | Stop reason: HOSPADM

## 2018-09-05 RX ADMIN — GABAPENTIN 300 MG: 300 CAPSULE ORAL at 09:09

## 2018-09-05 RX ADMIN — OXYCODONE HYDROCHLORIDE AND ACETAMINOPHEN 1 TABLET: 5; 325 TABLET ORAL at 11:09

## 2018-09-05 RX ADMIN — CARVEDILOL 25 MG: 6.25 TABLET, FILM COATED ORAL at 09:09

## 2018-09-05 RX ADMIN — ISOSORBIDE DINITRATE 10 MG: 10 TABLET ORAL at 05:09

## 2018-09-05 RX ADMIN — HEPARIN SODIUM 5000 UNITS: 5000 INJECTION, SOLUTION INTRAVENOUS; SUBCUTANEOUS at 08:09

## 2018-09-05 RX ADMIN — CARVEDILOL 25 MG: 6.25 TABLET, FILM COATED ORAL at 08:09

## 2018-09-05 RX ADMIN — ISOSORBIDE DINITRATE 10 MG: 10 TABLET ORAL at 12:09

## 2018-09-05 RX ADMIN — CLOPIDOGREL BISULFATE 75 MG: 75 TABLET ORAL at 01:09

## 2018-09-05 RX ADMIN — TRAMADOL HYDROCHLORIDE 50 MG: 50 TABLET, FILM COATED ORAL at 01:09

## 2018-09-05 RX ADMIN — HEPARIN SODIUM 5000 UNITS: 5000 INJECTION, SOLUTION INTRAVENOUS; SUBCUTANEOUS at 09:09

## 2018-09-05 RX ADMIN — ISOSORBIDE DINITRATE 10 MG: 10 TABLET ORAL at 09:09

## 2018-09-05 RX ADMIN — ISOSORBIDE DINITRATE 10 MG: 10 TABLET ORAL at 08:09

## 2018-09-05 RX ADMIN — ASPIRIN 81 MG: 81 TABLET, COATED ORAL at 09:09

## 2018-09-05 RX ADMIN — ATORVASTATIN CALCIUM 80 MG: 40 TABLET, FILM COATED ORAL at 09:09

## 2018-09-05 RX ADMIN — OXYCODONE HYDROCHLORIDE AND ACETAMINOPHEN 1 TABLET: 5; 325 TABLET ORAL at 05:09

## 2018-09-05 NOTE — CONSULTS
Renal Progress Note    Date of Admission:  9/4/2018  5:09 AM    Interim History: s/p LHC reportedly major blockages found and it was amendable to PCI-stent  (cath. Report pending)      Review of Systems:    No CP at present, breathing better     Physical Exam:    Vitals:    09/04/18 2339 09/05/18 0532 09/05/18 0801 09/05/18 1145   BP: 100/66 (!) 126/53 (!) 122/59 115/71   BP Location: Left leg Left leg Left leg Left arm   Patient Position: Lying Lying Lying Lying   Pulse: 100 (!) 59 96 85   Resp: 17 17 18 17   Temp: 97.7 °F (36.5 °C) 98.3 °F (36.8 °C) 98.3 °F (36.8 °C) 98.1 °F (36.7 °C)   TempSrc: Oral Oral Oral Oral   SpO2: 96% 98%  97%   Weight:  101.3 kg (223 lb 5.2 oz)     Height:           I/O last 3 completed shifts:  In: 983 [P.O.:480; I.V.:3; Other:500]  Out: 4500 [Other:4500]  I/O this shift:  In: 3 [I.V.:3]  Out: -     Constitutional: obese female in NAD  HENT: n/a  Neck: supple.   Cardiovascular: Normal rate and regular rhythm.   Pulmonary/Chest:  No respiratory distress.   Abdominal: obese  Musculoskeletal: She exhibits no edema.   Neurological: She is alert and oriented to person, place, and time.       Laboratories:    No results for input(s): WBC, RBC, HGB, HCT, PLT, MCV, MCH, MCHC in the last 24 hours.    No results for input(s): GLUCOSE, CALCIUM, PROT, NA, K, CO2, CL, BUN, CREATININE, ALKPHOS, ALT, AST, BILITOT in the last 24 hours.    Invalid input(s):  ALBUMIN  Phosphorus                  4.9       Troponin I                  3.555  Troponin I     BNP                  3,028  BNP     Diagnostic Tests:   X-Ray Chest AP Portable [[id number]  FINDINGS:  Monitoring leads overlie the chest.  There is a right-sided chest port in place with catheter tip projecting over the SVC.  There is postoperative change of prior median sternotomy.  There is stable enlargement of the cardiomediastinal silhouette.  Lungs are symmetrically expanded with increased interstitial and  parenchymal attenuation which can be seen with pulmonary edema/CHF.  Possible small component of right pleural fluid.  No pneumothorax.  Surgical clips project over the left axilla/chest wall.  Visualized osseous structures are intact.   Impression:       Cardiomegaly and findings suggestive of pulmonary edema/CHF.      Electronically signed by: Barby Cho MD  Date: 09/04/2018  Time: 06:43       Assessment:    51 y/o AAF with Hx. ESRD on HD admitted with:    - NSTEMI  - CAD s/p PCI  - s/p Fluid overload likely due to acute on  Chronic combined HF  - Hx. COPD  - HTN  - DM-2  - Anemia of CKD  - Hx. 2nd. hyperparathyroidism  - Hx. CAD    Plan:    - Dialysis in a.m.  - UF as tolerated  - No need for Epogen at present Hgb level  - Renal ADA diet w/ fluid restriction  - Cardiology following post LHC - PCI  - Glycemic control per admitting  - Smoking cessation a must

## 2018-09-05 NOTE — NURSING
Bedside rounding report given to katrina long rn on patients progress and updated handoff report sheet given to her. No acute events or changes. Bed alarm on call light in reach.

## 2018-09-05 NOTE — SUBJECTIVE & OBJECTIVE
Interval History: p ton bed rest s/p LHC; no CP    Review of Systems   Constitutional: Negative.    HENT: Negative.    Eyes: Negative.    Respiratory: Positive for shortness of breath.    Cardiovascular: Negative.    Gastrointestinal: Negative.    Endocrine: Negative.    Genitourinary: Positive for difficulty urinating.   Musculoskeletal: Negative.    Neurological: Negative.    Psychiatric/Behavioral: Negative.      Objective:     Vital Signs (Most Recent):  Temp: 98.1 °F (36.7 °C) (09/05/18 1145)  Pulse: 85 (09/05/18 1145)  Resp: 17 (09/05/18 1145)  BP: 115/71 (09/05/18 1145)  SpO2: 97 % (09/05/18 1145) Vital Signs (24h Range):  Temp:  [97.7 °F (36.5 °C)-98.3 °F (36.8 °C)] 98.1 °F (36.7 °C)  Pulse:  [] 85  Resp:  [17-18] 17  SpO2:  [96 %-99 %] 97 %  BP: ()/(50-86) 115/71     Weight: 101.3 kg (223 lb 5.2 oz)  Body mass index is 40.85 kg/m².    Intake/Output Summary (Last 24 hours) at 9/5/2018 1303  Last data filed at 9/5/2018 0900  Gross per 24 hour   Intake 983 ml   Output 4500 ml   Net -3517 ml      Physical Exam   Constitutional: She is oriented to person, place, and time. She appears well-developed and well-nourished. No distress.   HENT:   Head: Normocephalic and atraumatic.   Mouth/Throat: Oropharynx is clear and moist.   Eyes: EOM are normal. Pupils are equal, round, and reactive to light.   Neck: Normal range of motion. Neck supple.   Cardiovascular: Intact distal pulses.   Murmur heard.  S1 S2   Pulmonary/Chest: Effort normal and breath sounds normal. No respiratory distress.   Abdominal: Soft. Bowel sounds are normal. She exhibits no distension.   Musculoskeletal: Normal range of motion. She exhibits edema.   Neurological: She is alert and oriented to person, place, and time.   Skin: Capillary refill takes less than 2 seconds.   Psychiatric: She has a normal mood and affect. Her behavior is normal.       Significant Labs:   BMP:   Recent Labs   Lab  09/04/18   0547   GLU  144*   NA  140   K   4.3   CL  101   CO2  24   BUN  67*   CREATININE  9.8*   CALCIUM  9.4     CBC:   Recent Labs   Lab  09/04/18   0547   WBC  8.12   HGB  11.0*   HCT  35.2*   PLT  142*     Cardiac Markers:   Recent Labs   Lab  09/04/18   0547   BNP  3,028*     Troponin:   Recent Labs   Lab  09/04/18   1959  09/05/18   0256  09/05/18   0725   TROPONINI  3.435*  4.840*  4.773*       Significant Imaging: I have reviewed all pertinent imaging results/findings within the past 24 hours.     Description LHC per cardiology:   Culprit ostial lesion SVG to RCA reduced to 0% by direct stenting with a 3 x 9 mm resolute drug-eluting stent inflated up to 16 atmospheres at the ostium.  (* no filter wire was used with difficulty engaging off angle SVG with ostial lesion-- direct stent without complication of no reflow/ distal embolization)  There is severe native triple vessel disease.   There is the SVG to the LAD which is diseased at the anastomosis site into the lesion into the native vessel as well.  There is a patent SVG to diagonal.  There is no vein graft visualized to the left circumflex.  There is a severe left main lesion extending into serial 90% lesions in the left circumflex which is unprotected.

## 2018-09-05 NOTE — NURSING
"Bedside rounding report received from katrina long rn on patients progress and updated handoff report sheet . Assessment completed and plan of care updated on board and reviewed with patient. Nurse katrina informed me patient doesn 't want heart catheter test. I asked the patient why she did not want to consider this diagnostic procedure. Patient states ," I want a second opinion from my cardiologist. " reviewed elevated troponins results with patient and fact she continues to smoke cigarettes. Patient again replied , " I want to discuss this with my doctor I want a second opinion. Bed alarm on call light in reach head of bed elevated side rail up x 3 side rail up x 3 .    "

## 2018-09-05 NOTE — PROGRESS NOTES
[facility] Medicine  Progress Note    Patient Name: Josephine Aldrich  MRN: [id number]  Patient Class: IP- Inpatient   Admission Date: 9/4/2018  Length of Stay: 1 days  Attending Physician: Rayne Brandon MD  Primary Care Provider: Bunny Pinzon Jr, MD        Subjective:     Principal Problem:Non-STEMI (non-ST elevated myocardial infarction)    HPI:  53 yo female with CAD/CABG, COPD, combined CHF, DM2, HLP and ESRD on HD (TTS) presented from home with SOB. She was scheduled to get HD this morning but felt bad due to respiratory distress. She was given solumedrol and nebs by EMS and felt better on arrival. She denies angina but continues to feel SOB. Currently on 2 liters NC and no respiratory distress. On admit, trop > 2 and BNP >3000. CXR c/w CHF. Cardiology consulted in ED and per ED, will plan for LHC after HD. Nephrology consulted.     Of note, patient gets care at [place] (PCP and cardiologist). Pt asked to go to [place] but EMS reported there were no beds available there. LAst ECHO here 2013 - EF 45% with DD.     Hospital Course:  Pt admitted with SOB and elevated troponin/known CAD/CABG. Pt received HD yesterday and underwent LHC 9/5.  Findings below. Continue medical management. PLan for HD tomorrow.     Interval History: p ton bed rest s/p LHC; no CP    Review of Systems   Constitutional: Negative.    HENT: Negative.    Eyes: Negative.    Respiratory: Positive for shortness of breath.    Cardiovascular: Negative.    Gastrointestinal: Negative.    Endocrine: Negative.    Genitourinary: Positive for difficulty urinating.   Musculoskeletal: Negative.    Neurological: Negative.    Psychiatric/Behavioral: Negative.      Objective:     Vital Signs (Most Recent):  Temp: 98.1 °F (36.7 °C) (09/05/18 1145)  Pulse: 85 (09/05/18 1145)  Resp: 17 (09/05/18 1145)  BP: 115/71 (09/05/18 1145)  SpO2: 97 % (09/05/18 1145) Vital Signs (24h Range):  Temp:  [97.7 °F (36.5 °C)-98.3 °F  (36.8 °C)] 98.1 °F (36.7 °C)  Pulse:  [] 85  Resp:  [17-18] 17  SpO2:  [96 %-99 %] 97 %  BP: ()/(50-86) 115/71     Weight: 101.3 kg (223 lb 5.2 oz)  Body mass index is 40.85 kg/m².    Intake/Output Summary (Last 24 hours) at 9/5/2018 1303  Last data filed at 9/5/2018 0900  Gross per 24 hour   Intake 983 ml   Output 4500 ml   Net -3517 ml      Physical Exam   Constitutional: She is oriented to person, place, and time. She appears well-developed and well-nourished. No distress.   HENT:   Head: Normocephalic and atraumatic.   Mouth/Throat: Oropharynx is clear and moist.   Eyes: EOM are normal. Pupils are equal, round, and reactive to light.   Neck: Normal range of motion. Neck supple.   Cardiovascular: Intact distal pulses.   Murmur heard.  S1 S2   Pulmonary/Chest: Effort normal and breath sounds normal. No respiratory distress.   Abdominal: Soft. Bowel sounds are normal. She exhibits no distension.   Musculoskeletal: Normal range of motion. She exhibits edema.   Neurological: She is alert and oriented to person, place, and time.   Skin: Capillary refill takes less than 2 seconds.   Psychiatric: She has a normal mood and affect. Her behavior is normal.       Significant Labs:   BMP:   Recent Labs   Lab  09/04/18   0547   GLU  144*   NA  140   K  4.3   CL  101   CO2  24   BUN  67*   CREATININE  9.8*   CALCIUM  9.4     CBC:   Recent Labs   Lab  09/04/18   0547   WBC  8.12   HGB  11.0*   HCT  35.2*   PLT  142*     Cardiac Markers:   Recent Labs   Lab  09/04/18   0547   BNP  3,028*     Troponin:   Recent Labs   Lab  09/04/18   1959  09/05/18   0256  09/05/18   0725   TROPONINI  3.435*  4.840*  4.773*       Significant Imaging: I have reviewed all pertinent imaging results/findings within the past 24 hours.     Description LHC per cardiology:   Culprit ostial lesion SVG to RCA reduced to 0% by direct stenting with a 3 x 9 mm resolute drug-eluting stent inflated up to 16 atmospheres at the ostium.  (* no filter  wire was used with difficulty engaging off angle SVG with ostial lesion-- direct stent without complication of no reflow/ distal embolization)  There is severe native triple vessel disease.   There is the SVG to the LAD which is diseased at the anastomosis site into the lesion into the native vessel as well.  There is a patent SVG to diagonal.  There is no vein graft visualized to the left circumflex.  There is a severe left main lesion extending into serial 90% lesions in the left circumflex which is unprotected.          Assessment/Plan:      * Non-STEMI (non-ST elevated myocardial infarction)    Troponin peak 4.84  Aspirin, statin, bb  Long acting nitrate   Echo pending  [facility] 9/5 - KIARA placed SVG to RCA            Hyperlipidemia LDL goal <70    Resume statin   Goal for diabetic <70          ESRD (end stage renal disease)    Nephrology consulted  HD on TTS          Chronic obstructive pulmonary disease    Nebs PRN  Likely needs long acting inhalers ?  No indication for steroids            Diabetes mellitus with neuropathy    AIc pending  SSI  Diabetic diet           Chronic combined systolic and diastolic heart failure    HD for volume control  Daily weights  Cardiac/renal diet  ECHO pending           CAD (coronary artery disease)    See above           Secondary hyperparathyroidism    PTH and phosp pending           Anemia      Secondary to CKD  Defer to nephrology  No evidence of bleeding           VTE Risk Mitigation (From admission, onward)        Ordered     heparin (porcine) injection 5,000 Units  Every 12 hours      09/04/18 1239     IP VTE HIGH RISK PATIENT  Once      09/04/18 1059              Rayne Brandon MD  Department of Hospital Medicine   [facility]

## 2018-09-05 NOTE — ASSESSMENT & PLAN NOTE
Troponin peak 4.84  Aspirin, statin, bb  Long acting nitrate   Echo pending  [facility] 9/5 - KIARA placed SVG to RCA

## 2018-09-05 NOTE — NURSING
1730- tolerated supper meal covered with correction dose insulin as ordered . Vitals stable , relief obtained with tramadol . No changes on telemetry . Bed alarm on call light in reach head of bed elevated side rail up x 3 .

## 2018-09-05 NOTE — NURSING
1145- patient returned from cath lab report received from jasen samuels patient did have blockages major blockage was amendable to intervention did get stent to RCA area in her svg site. Bed rest up at 1330 today . Dr yan spoke with patient at bedside as well discussing treatment options and wants to follow up with patient as her cardiologist also strongly advised the patient to quit smoking. Patient started crying after her left I held her hand and explained to her she will have a better quality of life following up with dr yan and smoking cessation patient nodded her head in agreement. Provide ice chips to patient after ok from dr yan. . No acute changes on telemetry bed alarm on call light  In reach side rail up x 2 . Cycling vitals . Plan reviewed with patient.

## 2018-09-05 NOTE — NURSING
1000- patient did speak to dr yan this am and changed her mind about having a diagnostic cath . Pt transported to cath lab by bed . No acute distress or changes. She did void prior to leaving in toilet and has been npo status.       1111- awaiting patients return from cath lab.

## 2018-09-05 NOTE — BRIEF OP NOTE
[facility]  Brief Operative Note    SUMMARY     Surgery Date: 9/5/2018     Surgeon(s) and Role:     * Juan Solomon MD - Primary    Assisting Surgeon: None    Pre-op Diagnosis:  Non-STEMI (non-ST elevated myocardial infarction) [I21.4]    Post-op Diagnosis:    Non STEMI    Procedure(s) (LRB):  Left heart cath, groin (Left)    Anesthesia: RN IV Sedation    Description of Procedure:  Left heart catheterization with selective coronary angiography, graft restudy, PCI of SVG to RCA, ascending aortogram  Via right common femoral artery    Description of the findings of the procedure:  Culprit ostial lesion SVG to RCA reduced to 0% by direct stenting with a 3 x 9 mm resolute drug-eluting stent inflated up to 16 atmospheres at the ostium.  (* no filter wire was used with difficulty engaging off angle SVG with ostial lesion-- direct stent without complication of no reflow/ distal embolization)  There is severe native triple vessel disease.   There is the SVG to the LAD which is diseased at the anastomosis site into the lesion into the native vessel as well.  There is a patent SVG to diagonal.  There is no vein graft visualized to the left circumflex.  There is a severe left main lesion extending into serial 90% lesions in the left circumflex which is unprotected.        Recommendation:   -Continue medical management   -Consider staged revascularization of SVG to LAD as well as left main into left circumflex if continued symptoms on max med therapy  - if symptoms are stable consider PET scan for risk stratification    Estimated Blood Loss: <50 cc         Specimens:   Specimen (12h ago, onward)    None

## 2018-09-05 NOTE — PLAN OF CARE
09/05/18 0955   Discharge Assessment   Assessment Type Discharge Planning Assessment   Confirmed/corrected address and phone number on facesheet? Yes   Assessment information obtained from? Patient   Prior to hospitilization cognitive status: Alert/Oriented   Prior to hospitalization functional status: Independent   Current cognitive status: Alert/Oriented   Current Functional Status: Independent   Lives With alone   Able to Return to Prior Arrangements yes   Is patient able to care for self after discharge? Yes   Who are your caregiver(s) and their phone number(s)? miriam- [phone]   Patient's perception of discharge disposition home or selfcare   Readmission Within The Last 30 Days no previous admission in last 30 days   Patient currently being followed by outpatient case management? No   Patient currently receives any other outside agency services? No   Equipment Currently Used at Home glucometer   Do you have any problems affording any of your prescribed medications? No   Is the patient taking medications as prescribed? yes   Does the patient have transportation home? Yes   Transportation Available car;family or friend will provide   Does the patient receive services at the [facility]mad[facility] Clinic? No   Discharge Plan A Home with family   Discharge Plan B Home with family  (with follow up appointments)   Patient/Family In Agreement With Plan yes     [id number] [facility] [place] [address] AT [facility] & [address]  [place] [address]  Phone: [phone] Fax: [phone]

## 2018-09-06 VITALS
RESPIRATION RATE: 20 BRPM | BODY MASS INDEX: 42.19 KG/M2 | OXYGEN SATURATION: 100 % | TEMPERATURE: 98 F | SYSTOLIC BLOOD PRESSURE: 112 MMHG | DIASTOLIC BLOOD PRESSURE: 60 MMHG | WEIGHT: 229.25 LBS | HEIGHT: 62 IN | HEART RATE: 83 BPM

## 2018-09-06 LAB
POCT GLUCOSE: 125 MG/DL (ref 70–110)
POCT GLUCOSE: 128 MG/DL (ref 70–110)
POCT GLUCOSE: 149 MG/DL (ref 70–110)

## 2018-09-06 PROCEDURE — 94761 N-INVAS EAR/PLS OXIMETRY MLT: CPT

## 2018-09-06 PROCEDURE — 63600175 PHARM REV CODE 636 W HCPCS: Performed by: HOSPITALIST

## 2018-09-06 PROCEDURE — 99232 SBSQ HOSP IP/OBS MODERATE 35: CPT | Mod: ,,, | Performed by: INTERNAL MEDICINE

## 2018-09-06 PROCEDURE — 80100016 HC MAINTENANCE HEMODIALYSIS

## 2018-09-06 PROCEDURE — 25000003 PHARM REV CODE 250: Performed by: INTERNAL MEDICINE

## 2018-09-06 PROCEDURE — 25000003 PHARM REV CODE 250: Performed by: EMERGENCY MEDICINE

## 2018-09-06 PROCEDURE — 25000242 PHARM REV CODE 250 ALT 637 W/ HCPCS: Performed by: EMERGENCY MEDICINE

## 2018-09-06 PROCEDURE — 27000221 HC OXYGEN, UP TO 24 HOURS

## 2018-09-06 PROCEDURE — 94640 AIRWAY INHALATION TREATMENT: CPT

## 2018-09-06 RX ORDER — CLOPIDOGREL BISULFATE 75 MG/1
75 TABLET ORAL DAILY
Qty: 30 TABLET | Refills: 11 | Status: SHIPPED | OUTPATIENT
Start: 2018-09-07 | End: 2019-09-07

## 2018-09-06 RX ORDER — OXYCODONE AND ACETAMINOPHEN 5; 325 MG/1; MG/1
1 TABLET ORAL EVERY 4 HOURS PRN
Qty: 30 TABLET | Refills: 0 | Status: SHIPPED | OUTPATIENT
Start: 2018-09-06

## 2018-09-06 RX ORDER — ATORVASTATIN CALCIUM 80 MG/1
80 TABLET, FILM COATED ORAL DAILY
Qty: 90 TABLET | Refills: 3 | Status: SHIPPED | OUTPATIENT
Start: 2018-09-07 | End: 2019-09-07

## 2018-09-06 RX ADMIN — CLOPIDOGREL BISULFATE 75 MG: 75 TABLET ORAL at 08:09

## 2018-09-06 RX ADMIN — ATORVASTATIN CALCIUM 80 MG: 40 TABLET, FILM COATED ORAL at 08:09

## 2018-09-06 RX ADMIN — ALBUTEROL SULFATE 0.63 MG: 2.5 SOLUTION RESPIRATORY (INHALATION) at 06:09

## 2018-09-06 RX ADMIN — HEPARIN SODIUM 5000 UNITS: 5000 INJECTION, SOLUTION INTRAVENOUS; SUBCUTANEOUS at 08:09

## 2018-09-06 RX ADMIN — ASPIRIN 81 MG: 81 TABLET, COATED ORAL at 08:09

## 2018-09-06 NOTE — SUBJECTIVE & OBJECTIVE
Interval History:  Seen on dialysis.  When she was connected she started experiencing some palpitations but after weeks almond her she was reassured and felt better.      Telemetry: Normal sinus rate    Review of Systems   All other systems reviewed and are negative.    Objective:     Vital Signs (Most Recent):  Temp: 98 °F (36.7 °C) (09/06/18 0805)  Pulse: 86 (09/06/18 0805)  Resp: 18 (09/06/18 0805)  BP: (!) 108/54 (09/06/18 0805)  SpO2: 97 % (09/06/18 0805) Vital Signs (24h Range):  Temp:  [97.5 °F (36.4 °C)-98.1 °F (36.7 °C)] 98 °F (36.7 °C)  Pulse:  [68-91] 86  Resp:  [16-20] 18  SpO2:  [93 %-100 %] 97 %  BP: ()/(52-78) 108/54     Weight: 104 kg (229 lb 4.5 oz)  Body mass index is 41.94 kg/m².     SpO2: 97 %  O2 Device (Oxygen Therapy): nasal cannula      Intake/Output Summary (Last 24 hours) at 9/6/2018 1222  Last data filed at 9/5/2018 1800  Gross per 24 hour   Intake 480 ml   Output --   Net 480 ml       Lines/Drains/Airways     Drain                 Hemodialysis AV Fistula   Right upper arm -- days          Peripheral Intravenous Line                 Peripheral IV - Single Lumen 09/04/18 0508 Left Wrist 2 days                Physical Exam   Constitutional: She is oriented to person, place, and time. No distress.   Cardiovascular: Normal rate and regular rhythm.   Pulmonary/Chest: Effort normal and breath sounds normal.   Musculoskeletal: She exhibits no edema.   Right groin access site without hematoma or bruit   Neurological: She is alert and oriented to person, place, and time.       Significant Labs: BMP: No results for input(s): GLU, NA, K, CL, CO2, BUN, CREATININE, CALCIUM, MG in the last 48 hours. and CBC No results for input(s): WBC, HGB, HCT, PLT in the last 48 hours.    Significant Imaging: Echocardiogram:   2D echo with color flow doppler:   Results for orders placed or performed during the hospital encounter of 09/04/18   2D echo with color flow doppler   Result Value Ref Range    EF 45 55  - 65    Mitral Valve Regurgitation MODERATE (A)     Diastolic Dysfunction Yes (A)     Aortic Valve Regurgitation MILD TO MODERATE (A)     Est. PA Systolic Pressure 42.69 (A)     Pericardial Effusion TRIVIAL     Tricuspid Valve Regurgitation MILD TO MODERATE      LHC:  Description of Procedure:  Left heart catheterization with selective coronary angiography, graft restudy, PCI of SVG to RCA, ascending aortogram  Via right common femoral artery     Description of the findings of the procedure:  Culprit ostial lesion SVG to RCA reduced to 0% by direct stenting with a 3 x 9 mm resolute drug-eluting stent inflated up to 16 atmospheres at the ostium.  (* no filter wire was used with difficulty engaging off angle SVG with ostial lesion-- direct stent without complication of no reflow/ distal embolization)  There is severe native triple vessel disease.   There is the SVG to the LAD which is diseased at the anastomosis site into the lesion into the native vessel as well.  There is a patent SVG to diagonal.  There is no vein graft visualized to the left circumflex.  There is a severe left main lesion extending into serial 90% lesions in the left circumflex which is unprotected.         Recommendation:   -Continue medical management   -Consider staged revascularization of SVG to LAD as well as left main into left circumflex if continued symptoms on max med therapy  - if symptoms are stable consider PET scan for risk stratification

## 2018-09-06 NOTE — PROGRESS NOTES
[facility]  Cardiology  Progress Note    Patient Name: Josephine Aldrich  MRN: [id number]  Admission Date: 9/4/2018  Hospital Length of Stay: 2 days  Code Status: Full Code   Attending Physician: Rayne Brandon MD   Primary Care Physician: Bunny Pinzon Jr, MD  Expected Discharge Date:   Principal Problem:Non-STEMI (non-ST elevated myocardial infarction)    Subjective:     Hospital Course:    9-4:  Admitted with non STEMI.  Respiratory failure requiring dialysis    9-5:   Status post PCI of RCA SVG    Interval History:  Seen on dialysis.  When she was connected she started experiencing some palpitations but after weeks almond her she was reassured and felt better.      Telemetry: Normal sinus rate    Review of Systems   All other systems reviewed and are negative.    Objective:     Vital Signs (Most Recent):  Temp: 98 °F (36.7 °C) (09/06/18 0805)  Pulse: 86 (09/06/18 0805)  Resp: 18 (09/06/18 0805)  BP: (!) 108/54 (09/06/18 0805)  SpO2: 97 % (09/06/18 0805) Vital Signs (24h Range):  Temp:  [97.5 °F (36.4 °C)-98.1 °F (36.7 °C)] 98 °F (36.7 °C)  Pulse:  [68-91] 86  Resp:  [16-20] 18  SpO2:  [93 %-100 %] 97 %  BP: ()/(52-78) 108/54     Weight: 104 kg (229 lb 4.5 oz)  Body mass index is 41.94 kg/m².     SpO2: 97 %  O2 Device (Oxygen Therapy): nasal cannula      Intake/Output Summary (Last 24 hours) at 9/6/2018 1222  Last data filed at 9/5/2018 1800  Gross per 24 hour   Intake 480 ml   Output --   Net 480 ml       Lines/Drains/Airways     Drain                 Hemodialysis AV Fistula   Right upper arm -- days          Peripheral Intravenous Line                 Peripheral IV - Single Lumen 09/04/18 0508 Left Wrist 2 days                Physical Exam   Constitutional: She is oriented to person, place, and time. No distress.   Cardiovascular: Normal rate and regular rhythm.   Pulmonary/Chest: Effort normal and breath sounds normal.   Musculoskeletal: She exhibits no edema.   Right groin access  site without hematoma or bruit   Neurological: She is alert and oriented to person, place, and time.       Significant Labs: BMP: No results for input(s): GLU, NA, K, CL, CO2, BUN, CREATININE, CALCIUM, MG in the last 48 hours. and CBC No results for input(s): WBC, HGB, HCT, PLT in the last 48 hours.    Significant Imaging: Echocardiogram:   2D echo with color flow doppler:   Results for orders placed or performed during the hospital encounter of 09/04/18   2D echo with color flow doppler   Result Value Ref Range    EF 45 55 - 65    Mitral Valve Regurgitation MODERATE (A)     Diastolic Dysfunction Yes (A)     Aortic Valve Regurgitation MILD TO MODERATE (A)     Est. PA Systolic Pressure 42.69 (A)     Pericardial Effusion TRIVIAL     Tricuspid Valve Regurgitation MILD TO MODERATE      LHC:  Description of Procedure:  Left heart catheterization with selective coronary angiography, graft restudy, PCI of SVG to RCA, ascending aortogram  Via right common femoral artery     Description of the findings of the procedure:  Culprit ostial lesion SVG to RCA reduced to 0% by direct stenting with a 3 x 9 mm resolute drug-eluting stent inflated up to 16 atmospheres at the ostium.  (* no filter wire was used with difficulty engaging off angle SVG with ostial lesion-- direct stent without complication of no reflow/ distal embolization)  There is severe native triple vessel disease.   There is the SVG to the LAD which is diseased at the anastomosis site into the lesion into the native vessel as well.  There is a patent SVG to diagonal.  There is no vein graft visualized to the left circumflex.  There is a severe left main lesion extending into serial 90% lesions in the left circumflex which is unprotected.         Recommendation:   -Continue medical management   -Consider staged revascularization of SVG to LAD as well as left main into left circumflex if continued symptoms on max med therapy  - if symptoms are stable consider PET scan  for risk stratification           Assessment and Plan:     Brief HPI:     * Non-STEMI (non-ST elevated myocardial infarction)      Multivessel CAD  Status post PCI of culprit SVG to the RCA   may need outpatient intervention on the SVG to LAD or native left main into left circumflex which is unprotected if further symptoms on max medical therapy   if stable consideration is for PET stress to evaluate for ischemia in those territories      CAD (coronary artery disease)     Continue medicines for secondary prevention as tolerated        ESRD (end stage renal disease)     On HD,  Today's dialysis day and currently symptomatic   will need HD  To stabilize for respiratory prior to intervention        Hypertension             Type II or unspecified type diabetes mellitus with ophthalmic manifestations, uncontrolled(250.52)     Per IM        Hyperlipidemia LDL goal <70     On statin        Tobacco abuse      Counseled            VTE Risk Mitigation (From admission, onward)        Ordered     heparin (porcine) injection 5,000 Units  Every 12 hours      09/04/18 1239     IP VTE HIGH RISK PATIENT  Once      09/04/18 1059          Okay to DC from CV standpoint.  Follow up with me in 1 week on hospital discharge.    Juan Solomon MD  Cardiology  [facility]

## 2018-09-06 NOTE — HOSPITAL COURSE
9-4:  Admitted with non STEMI.  Respiratory failure requiring dialysis    9-5:   Status post PCI of RCA SVG

## 2018-09-06 NOTE — NURSING
Bedside rounding report given to cece guadarrama rn on patients progress and updated handoff report sheet given to him. No acute events. Right groin is soft and without hematoma or drainage on existing dressing clean dry and intact iv site clean dry and intact.

## 2018-09-06 NOTE — PLAN OF CARE
"   09/06/18 1616   Final Note   Assessment Type Final Discharge Note   Discharge Disposition Home   Hospital Follow Up  Appt(s) scheduled? Yes   Discharge plans and expectations educations in teach back method with documentation complete? Yes   Right Care Referral Info   Post Acute Recommendation No Care   EDUCATION:   provided with educational information on post angio stent placement.  Information reviewed and placed in :My Healthcare Packet" to be brought home  to use as resource after discharge.  Information included:  signs and symptoms to look for and call the doctor if experiencing, and symptoms that may indicate a medical emergency: CALL 911.      All questions answered.  Teach back method used.           "

## 2018-09-06 NOTE — CONSULTS
Renal Progress Note    Date of Admission:  9/4/2018  5:09 AM    Interim History: s/p LHC reportedly major blockages found and it was amendable to PCI-stent  (cath. Report pending)      Review of Systems:    Had complaints of CP at the beginning of Dialysis, Cardiologist at bedside talking to pte.    Physical Exam:    Vitals:    09/06/18 0605 09/06/18 0627 09/06/18 0712 09/06/18 0805   BP:  (!) 111/55  (!) 108/54   BP Location:  Left leg     Patient Position:  Lying  Lying   Pulse: 90 82  86   Resp: 20 18 18   Temp:  97.8 °F (36.6 °C)  98 °F (36.7 °C)   TempSrc:  Oral  Oral   SpO2: 97% 100% (!) 93% 97%   Weight:  104 kg (229 lb 4.5 oz)     Height:           I/O last 3 completed shifts:  In: 483 [P.O.:480; I.V.:3]  Out: 0   No intake/output data recorded.    Constitutional: obese female in NAD  HENT: n/a  Neck: supple.   Cardiovascular: Normal rate and regular rhythm.   Pulmonary/Chest:  No respiratory distress.   Abdominal: obese  Musculoskeletal: She exhibits no edema.   Neurological: She is alert and oriented to person, place, and time.       Laboratories:    No results for input(s): WBC, RBC, HGB, HCT, PLT, MCV, MCH, MCHC in the last 24 hours.    No results for input(s): GLUCOSE, CALCIUM, PROT, NA, K, CO2, CL, BUN, CREATININE, ALKPHOS, ALT, AST, BILITOT in the last 24 hours.    Invalid input(s):  ALBUMIN  Phosphorus                  4.9       Troponin I                  3.555  Troponin I     BNP                  3,028  BNP     Diagnostic Tests:   X-Ray Chest AP Portable [[id number]  FINDINGS:  Monitoring leads overlie the chest.  There is a right-sided chest port in place with catheter tip projecting over the SVC.  There is postoperative change of prior median sternotomy.  There is stable enlargement of the cardiomediastinal silhouette.  Lungs are symmetrically expanded with increased interstitial and parenchymal attenuation which can be seen with pulmonary edema/CHF.  Possible small  "component of right pleural fluid.  No pneumothorax.  Surgical clips project over the left axilla/chest wall.  Visualized osseous structures are intact.   Impression:       Cardiomegaly and findings suggestive of pulmonary edema/CHF.      Electronically signed by: Barby Cho MD  Date: 09/04/2018  Time: 06:43       Assessment:    53 y/o AAF with Hx. ESRD on HD admitted with:    - NSTEMI  - CAD s/p PCI  - s/p Fluid overload likely due to acute on  Chronic combined HF  - Hx. COPD  - HTN  - DM-2  - Anemia of CKD  - Hx. 2nd. hyperparathyroidism  - Hx. CAD    Plan:    - Dialysis in progress  - UF as tolerated  - No need for Epogen at present Hgb level  - Renal ADA diet w/ fluid restriction  - Cardiology following post LHC - PCI  - Glycemic control per admitting  - Smoking cessation a must, discussed this with pte. Who tells me "it is difficult to quit since you been smoking since age 14"    Pte. Aware of risks should she continues to smoke including DEATH.    Will f/u for Dialysis only              "

## 2018-09-06 NOTE — PROGRESS NOTES
WRITTEN HEALTHCARE DISCHARGE INFORMATION      Things that YOU are responsible for to Manage Your Care At Home:  1. Getting your prescriptions filled.  2. Taking you medications as directed. DO NOT MISS ANY DOSES!  3. Going to your follow-up doctor appointments. This is important because it allows the doctor to monitor your progress and to determine if any changes need to be made to your treatment plan.     If you are unable to make your follow up appointments, please call the number listed and reschedule this appointment.      After discharge, if you need assistance, you can call [facility] for 24/7 assistance at [phone]     If you are experience any signs or symptoms, Call your doctor or Call 911 and come to your nearest Emergency Room.     Thank you for choosing [facility] and allowing us to care for you.        You should receive a call from [facility]charge [facility] within 48-72 hours to help manage your care after discharge. Please try to make sure that you answer your phone for this important phone call.     Follow-up Information     Juan Solomon MD On 9/17/2018.    Specialties:  INTERVENTIONAL CARDIOLOGY, Cardiology  Why:  Appointment scheduled for Monday September 17th at 9am  Contact information:  [address]  [address]  [facility] [address]  [phone]             Bunny Pinzon Jr, MD.    Specialty:  Family Medicine  Why:  call to schedule follow up as needed  Contact information:  [address]  [place] [address]  [phone]

## 2018-09-06 NOTE — PROGRESS NOTES
AAOX3  Denies any needs and no distress noted.  VSS.  IV and tele dc'd.  DC and Rx instructions reviewed written and oral.  Pt verbalized understanding.  Waiting for family to DC pt home.

## 2018-09-07 LAB
CORONARY STENOSIS: ABNORMAL
CORONARY STENT: YES
PERIPHERAL STENOSIS: ABNORMAL

## 2018-09-10 ENCOUNTER — PATIENT OUTREACH (OUTPATIENT)
Dept: ADMINISTRATIVE | Facility: CLINIC | Age: 53
End: 2018-09-10

## 2018-09-10 NOTE — PATIENT INSTRUCTIONS
Discharge Instructions for Heart Attack  You have had a heart attack. Also known as acute myocardial infarction, or AMI, a heart attack occurs when a vessel supplying the heart with blood suddenly becomes blocked. Follow these guidelines for home care and lifestyle changes.  Home Care  Take your medications exactly as directed. Dont skip doses.  Remember that recovery after a heart attack takes time. Plan to rest for at least 4-8 weeks while you recover. Then return to normal activity when your doctor says its okay.  Ask your doctor about joining a heart rehabilitation program.  Tell your doctor if you are feeling depressed. Feelings of sadness are common after a heart attack, but it is important that you speak to someone if you are feeling overwhelmed by these feelings.  If you are having chest pain, call 911 for an ambulance. Do NOT drive yourself to the hospital.  Ask your family members to learn CPR.  Learn to take your own blood pressure and pulse. Keep a record of your results. Ask your doctor when you should seek emergency medical attention. He or she will tell you which blood pressure reading is dangerous.  Lifestyle Changes  Maintain a healthy weight. Get help to lose any extra pounds.  Cut back on salt.  Limit canned, dried, packaged, and fast foods.  Dont add salt to your food.  Season foods with herbs instead of salt when you cook.  Break the smoking habit. Enroll in a stop-smoking program to improve your chances of success.  Limit fatty foods.  Check your lipid levels regularly. (Your doctor can show you how to do this.)  Build up your activity according to your doctors recommendation.  Ask your doctor when its okay to resume sexual activity.  Tell your doctor about any erectile dysfunction (ED) medication you are taking. Some ED medications are not safe if you take certain heart medications.  Try to manage stress.  Follow-Up  Make a follow-up appointment as directed by our staff.    When to Seek  Medical Attention  Call 911 right away if you have:  Chest pain that is not relieved by medication.  Shortness of breath.  Otherwise, call your doctor immediately if you have:  Lightheadedness, dizziness, or fainting.  Feeling of irregular heartbeat or fast pulse.   © 5424-6255 [employer], [address] [place] [address]. All rights reserved. This information is not intended as a substitute for professional medical care. Always follow your healthcare professional's instructions.

## 2018-09-25 NOTE — DISCHARGE SUMMARY
[facility] Medicine  Discharge Summary      Patient Name: Josephine Aldrich  MRN: [id number]  Admission Date: 9/4/2018  Hospital Length of Stay: 2 days  Discharge Date and Time: 9/6/2018  Attending Physician: No att. providers found   Discharging Provider: Rayne Brandon MD  Primary Care Provider: Bunny Pinzon Jr, MD      HPI:   53 yo female with CAD/CABG, COPD, combined CHF, DM2, HLP and ESRD on HD (TTS) presented from home with SOB. She was scheduled to get HD this morning but felt bad due to respiratory distress. She was given solumedrol and nebs by EMS and felt better on arrival. She denies angina but continues to feel SOB. Currently on 2 liters NC and no respiratory distress. On admit, trop > 2 and BNP >3000. CXR c/w CHF. Cardiology consulted in ED and per ED, will plan for LHC after HD. Nephrology consulted.     Of note, patient gets care at [place] (PCP and cardiologist). Pt asked to go to [place] but EMS reported there were no beds available there. LAst ECHO here 2013 - EF 45% with DD.     Procedure(s) (LRB):  Left heart cath, groin (Left)      Hospital Course:   Pt admitted with SOB and elevated troponin/known CAD/CABG. Pt received HD yesterday and underwent LHC 9/5.  Findings below. Continue medical management. PLan for HD tomorrow.     9/6 - ok for dc home; continue HD as scheduled; continue aspirin, plavix and statin; nephrology to monitor anemia closely on DAPT. Follow up with cardiology as scheduled.      Consults:   Consults (From admission, onward)        Status Ordering Provider     Inpatient consult to Cardiology  Once     Provider:  Juan Solomon MD    Completed SALINA ACKERMAN          No new Assessment & Plan notes have been filed under this hospital service since the last note was generated.  Service: Hospital Medicine    Final Active Diagnoses:    Diagnosis Date Noted POA    PRINCIPAL PROBLEM:  Non-STEMI (non-ST elevated myocardial  infarction) [I21.4] 09/04/2018 Yes    Chronic obstructive pulmonary disease [J44.9] 09/04/2018 Yes    ESRD (end stage renal disease) [N18.6] 09/04/2018 Yes    Hyperlipidemia LDL goal <70 [E78.5] 09/04/2018 Yes    Tobacco abuse [Z72.0] 12/26/2013 Yes     Chronic    Diabetes mellitus with neuropathy [E11.40] 08/26/2013 Yes    Chronic combined systolic and diastolic heart failure [I50.42] 06/25/2013 Yes    Anemia [D64.9]  Yes    CAD (coronary artery disease) [I25.10]  Yes    Secondary hyperparathyroidism [N25.81]  Yes    Hypertension [I10]  Yes      Problems Resolved During this Admission:       Discharged Condition: good    Disposition: Home or Self Care    Follow Up:  Follow-up Information     Juan Solomon MD On 9/17/2018.    Specialties:  INTERVENTIONAL CARDIOLOGY, Cardiology  Why:  Appointment scheduled for Monday September 17th at 9am  Contact information:  [address]  [address]  [facility] [address]  [phone]             Bunny Pinzon Jr, MD.    Specialty:  Family Medicine  Why:  call to schedule follow up as needed  Contact information:  [address]  [address]  [facility] [address]  [phone]                 Patient Instructions:      Diet diabetic     Diet Cardiac     Diet renal     Notify your health care provider if you experience any of the following:  persistent nausea and vomiting or diarrhea     Notify your health care provider if you experience any of the following:  severe uncontrolled pain     Notify your health care provider if you experience any of the following:  difficulty breathing or increased cough     Activity as tolerated       Significant Diagnostic Studies: [facility]  B. Summary/Post-Operative Diagnosis      1.   Three vessel coronary artery disease.   2.   Successful PCI of culprit SVG to RCA.    ECHO:  CONCLUSIONS     1 - Mildly depressed left ventricular systolic function (EF 45-50%).     2 - Wall motion abnormalities.     3 -  Eccentric hypertrophy.     4 - Moderate left atrial enlargement.     5 - Impaired LV relaxation, elevated LAP (grade 2 diastolic dysfunction).     6 - Pulmonary hypertension. The estimated PA systolic pressure is 43 mmHg.     7 - Trivial pericardial effusion.     8 - Mild to moderate aortic regurgitation.     9 - Moderate mitral regurgitation.     Pending Diagnostic Studies:     None         Medications:  Reconciled Home Medications:      Medication List      START taking these medications    atorvastatin 80 MG tablet  Commonly known as:  LIPITOR  Take 1 tablet (80 mg total) by mouth once daily.     clopidogrel 75 mg tablet  Commonly known as:  PLAVIX  Take 1 tablet (75 mg total) by mouth once daily.     oxyCODONE-acetaminophen 5-325 mg per tablet  Commonly known as:  PERCOCET  Take 1 tablet by mouth every 4 (four) hours as needed.        CONTINUE taking these medications    albuterol 0.63 mg/3 mL Nebu  Commonly known as:  ACCUNEB  Take 0.63 mg by nebulization every 6 (six) hours as needed. Rescue     aspirin 81 MG EC tablet  Commonly known as:  ECOTRIN  Take 81 mg by mouth once daily.     blood sugar diagnostic Strp  Commonly known as:  FREESTYLE INSULINX TEST STRIPS  1 strip by Misc.(Non-Drug; Combo Route) route 4 (four) times daily with meals and nightly.     carvedilol 25 MG tablet  Commonly known as:  COREG  Take 1 tablet (25 mg total) by mouth 2 (two) times daily.     fluticasone 50 mcg/actuation Dsdv  Commonly known as:  FLOVENT DISKUS  Inhale into the lungs.     gabapentin 300 MG capsule  Commonly known as:  NEURONTIN  Take 1 capsule (300 mg total) by mouth 2 (two) times daily.     insulin syringes (disposable) 1 mL Syrg  1 Syringe by Misc.(Non-Drug; Combo Route) route 4 (four) times daily before meals and nightly.     isosorbide dinitrate 10 MG tablet  Commonly known as:  ISORDIL  Take 10 mg by mouth 4 (four) times daily.     lancets 28 gauge Misc  Commonly known as:  FREESTYLE LANCETS  1 lancet by  Misc.(Non-Drug; Combo Route) route 2 hours after meals and at bedtime.        STOP taking these medications    nitroGLYCERIN 0.4 MG SL tablet  Commonly known as:  NITROSTAT            Indwelling Lines/Drains at time of discharge:   Lines/Drains/Airways     Drain                 Hemodialysis AV Fistula   Right upper arm -- days                Time spent on the discharge of patient: 40 minutes  Patient was seen and examined on the date of discharge and determined to be suitable for discharge.         Rayne Brandon MD  Department of Hospital Medicine  [facility]

## 2021-08-22 NOTE — HOSPITAL COURSE
Pt admitted with SOB and elevated troponin/known CAD/CABG. Pt received HD yesterday and underwent LHC 9/5.  Findings below. Continue medical management. PLan for HD tomorrow.     9/6 - ok for dc home; continue HD as scheduled; continue aspirin, plavix and statin; nephrology to monitor anemia closely on DAPT. Follow up with cardiology as scheduled.    40w

## 2021-12-09 NOTE — NURSING
1400- Bedrest up at 1330 right groin soft and stable patient does report tenderness dressing dry intact. Bed alarm on call light in reach . Head of bed up side rail up x 2.       1547- patient off the floor in cardiology for 2 d echo test . No changes on telemetry monitor awaiting patients return to floor.    Azathioprine Counseling:  I discussed with the patient the risks of azathioprine including but not limited to myelosuppression, immunosuppression, hepatotoxicity, lymphoma, and infections.  The patient understands that monitoring is required including baseline LFTs, Creatinine, possible TPMP genotyping and weekly CBCs for the first month and then every 2 weeks thereafter.  The patient verbalized understanding of the proper use and possible adverse effects of azathioprine.  All of the patient's questions and concerns were addressed.